# Patient Record
Sex: FEMALE | Race: WHITE | Employment: OTHER | URBAN - METROPOLITAN AREA
[De-identification: names, ages, dates, MRNs, and addresses within clinical notes are randomized per-mention and may not be internally consistent; named-entity substitution may affect disease eponyms.]

---

## 2017-04-14 ENCOUNTER — HOSPITAL ENCOUNTER (OUTPATIENT)
Dept: GENERAL RADIOLOGY | Age: 72
Discharge: HOME | End: 2017-04-14
Payer: MEDICARE

## 2017-04-14 DIAGNOSIS — J41.0 SIMPLE CHRONIC BRONCHITIS (HCC): ICD-10-CM

## 2017-04-14 PROCEDURE — 71020 XR CHEST PA LAT: CPT

## 2017-09-27 ENCOUNTER — HOSPITAL ENCOUNTER (OUTPATIENT)
Dept: MAMMOGRAPHY | Age: 72
Discharge: HOME | End: 2017-09-27
Attending: FAMILY MEDICINE
Payer: MEDICARE

## 2017-09-27 DIAGNOSIS — Z12.31 VISIT FOR SCREENING MAMMOGRAM: ICD-10-CM

## 2017-09-27 PROCEDURE — 77063 BREAST TOMOSYNTHESIS BI: CPT

## 2018-04-11 ENCOUNTER — HOSPITAL ENCOUNTER (OUTPATIENT)
Dept: GENERAL RADIOLOGY | Age: 73
Discharge: HOME | End: 2018-04-11
Attending: FAMILY MEDICINE
Payer: MEDICARE

## 2018-04-11 DIAGNOSIS — E55.9 VITAMIN D DEFICIENCY, UNSPECIFIED: ICD-10-CM

## 2018-04-11 DIAGNOSIS — M81.0 AGE-RELATED OSTEOPOROSIS WITHOUT CURRENT PATHOLOGICAL FRACTURE: ICD-10-CM

## 2018-04-11 PROCEDURE — 77080 DXA BONE DENSITY AXIAL: CPT

## 2018-04-19 ENCOUNTER — HOSPITAL ENCOUNTER (OUTPATIENT)
Dept: GENERAL RADIOLOGY | Age: 73
Discharge: HOME | End: 2018-04-19
Payer: MEDICARE

## 2018-04-19 DIAGNOSIS — M54.50 LOW BACK PAIN: ICD-10-CM

## 2018-04-19 PROCEDURE — 72120 X-RAY BEND ONLY L-S SPINE: CPT

## 2018-04-27 ENCOUNTER — HOSPITAL ENCOUNTER (OUTPATIENT)
Dept: MRI IMAGING | Age: 73
Discharge: HOME | End: 2018-04-27
Attending: FAMILY MEDICINE
Payer: MEDICARE

## 2018-04-27 DIAGNOSIS — M54.50 LUMBAGO: ICD-10-CM

## 2018-04-27 PROCEDURE — 72146 MRI CHEST SPINE W/O DYE: CPT

## 2018-04-27 PROCEDURE — 72148 MRI LUMBAR SPINE W/O DYE: CPT

## 2018-08-21 ENCOUNTER — HOSPITAL ENCOUNTER (OUTPATIENT)
Dept: CT IMAGING | Age: 73
Discharge: HOME | End: 2018-08-21
Attending: OTOLARYNGOLOGY
Payer: MEDICARE

## 2018-08-21 DIAGNOSIS — R22.1 NECK MASS: ICD-10-CM

## 2018-08-21 LAB — CREAT UR-MCNC: 0.8 MG/DL (ref 0.6–1.3)

## 2018-08-21 PROCEDURE — 70491 CT SOFT TISSUE NECK W/DYE: CPT

## 2018-08-21 PROCEDURE — 74011636320 HC RX REV CODE- 636/320: Performed by: OTOLARYNGOLOGY

## 2018-08-21 PROCEDURE — 82565 ASSAY OF CREATININE: CPT

## 2018-08-21 RX ADMIN — IOPAMIDOL 80 ML: 612 INJECTION, SOLUTION INTRAVENOUS at 08:33

## 2018-09-28 ENCOUNTER — HOSPITAL ENCOUNTER (OUTPATIENT)
Dept: MAMMOGRAPHY | Age: 73
Discharge: HOME | End: 2018-09-28
Attending: FAMILY MEDICINE
Payer: MEDICARE

## 2018-09-28 DIAGNOSIS — Z12.31 VISIT FOR SCREENING MAMMOGRAM: ICD-10-CM

## 2018-09-28 PROCEDURE — 77063 BREAST TOMOSYNTHESIS BI: CPT

## 2019-05-08 ENCOUNTER — HOSPITAL ENCOUNTER (OUTPATIENT)
Dept: GENERAL RADIOLOGY | Age: 74
Discharge: HOME | End: 2019-05-08
Payer: MEDICARE

## 2019-05-08 DIAGNOSIS — J44.9 CHRONIC OBSTRUCTIVE PULMONARY DISEASE (COPD) (HCC): ICD-10-CM

## 2019-05-08 PROCEDURE — 71046 X-RAY EXAM CHEST 2 VIEWS: CPT

## 2019-09-04 ENCOUNTER — NURSE NAVIGATOR (OUTPATIENT)
Dept: OTHER | Age: 74
End: 2019-09-04

## 2019-09-04 NOTE — NURSE NAVIGATOR
Referring Provider: Beau Girard MD      Lung Cancer Risk Profile:   Age: 72  Gender: Female  Height: 66\"  Weight: 189#    Smoking History:  Smoking Status: current use  # years smokin  # years quit: 0  Packs/day: 1.5  Pack years: 66    Patient discussed smoking cessation with PCP: Yes, per patient report    Patient participated in shared decision making process with PCP: Unknown    Patient is currently experiencing symptoms: No, per patient report    If yes what symptoms:     Co-Morbidities:  COPD    Cancer History:  Uterine    Additional Risk Factors:    Exposure to second hand smoke      Patient's smoking history discussed via phone. Patient meets LDCT lung cancer screening criteria.  Call transferred to central scheduling to schedule exam.      JEAN PIERRE BullockN, RN, [address] [address] [facility] Nurse Navigator

## 2019-10-04 ENCOUNTER — HOSPITAL ENCOUNTER (OUTPATIENT)
Dept: MAMMOGRAPHY | Age: 74
Discharge: HOME | End: 2019-10-04
Attending: FAMILY MEDICINE
Payer: MEDICARE

## 2019-10-04 DIAGNOSIS — Z12.31 VISIT FOR SCREENING MAMMOGRAM: ICD-10-CM

## 2019-10-04 PROCEDURE — 77063 BREAST TOMOSYNTHESIS BI: CPT

## 2019-11-19 ENCOUNTER — HOSPITAL ENCOUNTER (OUTPATIENT)
Dept: CT IMAGING | Age: 74
Discharge: HOME | End: 2019-11-19
Attending: FAMILY MEDICINE
Payer: MEDICARE

## 2019-11-19 VITALS — HEIGHT: 66 IN | WEIGHT: 246.91 LBS | BODY MASS INDEX: 39.68 KG/M2

## 2019-11-19 DIAGNOSIS — Z72.0 TOBACCO ABUSE: ICD-10-CM

## 2019-11-19 PROCEDURE — G0297 LDCT FOR LUNG CA SCREEN: HCPCS

## 2020-04-24 ENCOUNTER — APPOINTMENT (OUTPATIENT)
Dept: GENERAL RADIOLOGY | Age: 75
DRG: 482 | End: 2020-04-24
Attending: EMERGENCY MEDICINE
Payer: MEDICARE

## 2020-04-24 ENCOUNTER — HOSPITAL ENCOUNTER (INPATIENT)
Age: 75
LOS: 4 days | Discharge: NURSING, REHAB OR LONG-TERM CARE | DRG: 482 | End: 2020-04-28
Attending: EMERGENCY MEDICINE | Admitting: HOSPITALIST
Payer: MEDICARE

## 2020-04-24 ENCOUNTER — ANESTHESIA EVENT (OUTPATIENT)
Dept: SURGERY | Age: 75
DRG: 482 | End: 2020-04-24
Payer: MEDICARE

## 2020-04-24 DIAGNOSIS — S82.832A CLOSED FRACTURE OF DISTAL END OF LEFT FIBULA, UNSPECIFIED FRACTURE MORPHOLOGY, INITIAL ENCOUNTER: ICD-10-CM

## 2020-04-24 DIAGNOSIS — S72.145A CLOSED NONDISPLACED INTERTROCHANTERIC FRACTURE OF LEFT FEMUR, INITIAL ENCOUNTER (HCC): Primary | ICD-10-CM

## 2020-04-24 PROBLEM — J44.9 COPD (CHRONIC OBSTRUCTIVE PULMONARY DISEASE) (HCC): Status: ACTIVE | Noted: 2020-04-24

## 2020-04-24 PROBLEM — S72.009A HIP FRACTURE (HCC): Status: ACTIVE | Noted: 2020-04-24

## 2020-04-24 LAB
ABO + RH BLD: NORMAL
ALBUMIN SERPL-MCNC: 2.9 G/DL (ref 3.4–5)
ALBUMIN/GLOB SERPL: 0.8 {RATIO} (ref 0.8–1.7)
ALP SERPL-CCNC: 84 U/L (ref 45–117)
ALT SERPL-CCNC: 22 U/L (ref 13–56)
ANION GAP SERPL CALC-SCNC: 5 MMOL/L (ref 3–18)
APPEARANCE UR: CLEAR
AST SERPL-CCNC: 20 U/L (ref 10–38)
BACTERIA URNS QL MICRO: ABNORMAL /HPF
BASOPHILS # BLD: 0 K/UL (ref 0–0.1)
BASOPHILS NFR BLD: 0 % (ref 0–3)
BILIRUB SERPL-MCNC: 0.4 MG/DL (ref 0.2–1)
BILIRUB UR QL: NEGATIVE
BLOOD GROUP ANTIBODIES SERPL: NORMAL
BUN SERPL-MCNC: 15 MG/DL (ref 7–18)
BUN/CREAT SERPL: 19 (ref 12–20)
CALCIUM SERPL-MCNC: 9.1 MG/DL (ref 8.5–10.1)
CHLORIDE SERPL-SCNC: 107 MMOL/L (ref 100–111)
CO2 SERPL-SCNC: 25 MMOL/L (ref 21–32)
COLOR UR: ABNORMAL
CREAT SERPL-MCNC: 0.78 MG/DL (ref 0.6–1.3)
DIFFERENTIAL METHOD BLD: ABNORMAL
EOSINOPHIL # BLD: 0 K/UL (ref 0–0.4)
EOSINOPHIL NFR BLD: 0 % (ref 0–5)
EPITH CASTS URNS QL MICRO: ABNORMAL /LPF (ref 0–5)
ERYTHROCYTE [DISTWIDTH] IN BLOOD BY AUTOMATED COUNT: 14.3 % (ref 11.6–14.5)
GLOBULIN SER CALC-MCNC: 3.7 G/DL (ref 2–4)
GLUCOSE SERPL-MCNC: 104 MG/DL (ref 74–99)
GLUCOSE UR STRIP.AUTO-MCNC: NEGATIVE MG/DL
HCT VFR BLD AUTO: 32.6 % (ref 35–45)
HGB BLD-MCNC: 10.7 G/DL (ref 12–16)
HGB UR QL STRIP: NEGATIVE
HYALINE CASTS URNS QL MICRO: ABNORMAL /LPF (ref 0–2)
INR PPP: 1.2 (ref 0.8–1.2)
KETONES UR QL STRIP.AUTO: NEGATIVE MG/DL
LEUKOCYTE ESTERASE UR QL STRIP.AUTO: ABNORMAL
LYMPHOCYTES # BLD: 1.4 K/UL (ref 0.8–3.5)
LYMPHOCYTES NFR BLD: 12 % (ref 20–51)
MCH RBC QN AUTO: 33.3 PG (ref 24–34)
MCHC RBC AUTO-ENTMCNC: 32.8 G/DL (ref 31–37)
MCV RBC AUTO: 101.6 FL (ref 74–97)
MONOCYTES # BLD: 0.4 K/UL (ref 0–1)
MONOCYTES NFR BLD: 3 % (ref 2–9)
MUCOUS THREADS URNS QL MICRO: ABNORMAL /LPF
NEUTS SEG # BLD: 9.9 K/UL (ref 1.8–8)
NEUTS SEG NFR BLD: 85 % (ref 42–75)
NITRITE UR QL STRIP.AUTO: NEGATIVE
PH UR STRIP: 6 [PH] (ref 5–8)
PLATELET # BLD AUTO: 336 K/UL (ref 135–420)
PMV BLD AUTO: 10.2 FL (ref 9.2–11.8)
POTASSIUM SERPL-SCNC: 4.4 MMOL/L (ref 3.5–5.5)
PROT SERPL-MCNC: 6.6 G/DL (ref 6.4–8.2)
PROT UR STRIP-MCNC: NEGATIVE MG/DL
PROTHROMBIN TIME: 14.9 SEC (ref 11.5–15.2)
RBC # BLD AUTO: 3.21 M/UL (ref 4.2–5.3)
RBC #/AREA URNS HPF: ABNORMAL /HPF (ref 0–5)
RBC MORPH BLD: ABNORMAL
SODIUM SERPL-SCNC: 137 MMOL/L (ref 136–145)
SP GR UR REFRACTOMETRY: 1.01 (ref 1–1.03)
SPECIMEN EXP DATE BLD: NORMAL
UROBILINOGEN UR QL STRIP.AUTO: 0.2 EU/DL (ref 0.2–1)
WBC # BLD AUTO: 11.7 K/UL (ref 4.6–13.2)
WBC URNS QL MICRO: ABNORMAL /HPF (ref 0–4)

## 2020-04-24 PROCEDURE — 96374 THER/PROPH/DIAG INJ IV PUSH: CPT

## 2020-04-24 PROCEDURE — 85025 COMPLETE CBC W/AUTO DIFF WBC: CPT

## 2020-04-24 PROCEDURE — 74011000250 HC RX REV CODE- 250: Performed by: HOSPITALIST

## 2020-04-24 PROCEDURE — 73552 X-RAY EXAM OF FEMUR 2/>: CPT

## 2020-04-24 PROCEDURE — 77030040830 HC CATH URETH FOL MDII -A

## 2020-04-24 PROCEDURE — 96375 TX/PRO/DX INJ NEW DRUG ADDON: CPT

## 2020-04-24 PROCEDURE — 74011250636 HC RX REV CODE- 250/636: Performed by: HOSPITALIST

## 2020-04-24 PROCEDURE — 96376 TX/PRO/DX INJ SAME DRUG ADON: CPT

## 2020-04-24 PROCEDURE — 81001 URINALYSIS AUTO W/SCOPE: CPT

## 2020-04-24 PROCEDURE — 77030040361 HC SLV COMPR DVT MDII -B

## 2020-04-24 PROCEDURE — 80053 COMPREHEN METABOLIC PANEL: CPT

## 2020-04-24 PROCEDURE — 72170 X-RAY EXAM OF PELVIS: CPT

## 2020-04-24 PROCEDURE — 86901 BLOOD TYPING SEROLOGIC RH(D): CPT

## 2020-04-24 PROCEDURE — 75810000053 HC SPLINT APPLICATION

## 2020-04-24 PROCEDURE — 85610 PROTHROMBIN TIME: CPT

## 2020-04-24 PROCEDURE — 94640 AIRWAY INHALATION TREATMENT: CPT

## 2020-04-24 PROCEDURE — 99285 EMERGENCY DEPT VISIT HI MDM: CPT

## 2020-04-24 PROCEDURE — 73610 X-RAY EXAM OF ANKLE: CPT

## 2020-04-24 PROCEDURE — 74011250636 HC RX REV CODE- 250/636: Performed by: EMERGENCY MEDICINE

## 2020-04-24 PROCEDURE — 65270000029 HC RM PRIVATE

## 2020-04-24 RX ORDER — MORPHINE SULFATE 2 MG/ML
2 INJECTION, SOLUTION INTRAMUSCULAR; INTRAVENOUS
Status: DISCONTINUED | OUTPATIENT
Start: 2020-04-24 | End: 2020-04-28 | Stop reason: HOSPADM

## 2020-04-24 RX ORDER — MORPHINE SULFATE 4 MG/ML
4 INJECTION, SOLUTION INTRAMUSCULAR; INTRAVENOUS
Status: COMPLETED | OUTPATIENT
Start: 2020-04-24 | End: 2020-04-24

## 2020-04-24 RX ORDER — MORPHINE SULFATE 2 MG/ML
4 INJECTION, SOLUTION INTRAMUSCULAR; INTRAVENOUS ONCE
Status: COMPLETED | OUTPATIENT
Start: 2020-04-24 | End: 2020-04-24

## 2020-04-24 RX ORDER — ONDANSETRON 2 MG/ML
4 INJECTION INTRAMUSCULAR; INTRAVENOUS
Status: COMPLETED | OUTPATIENT
Start: 2020-04-24 | End: 2020-04-24

## 2020-04-24 RX ORDER — ARFORMOTEROL TARTRATE 15 UG/2ML
15 SOLUTION RESPIRATORY (INHALATION)
Status: DISCONTINUED | OUTPATIENT
Start: 2020-04-24 | End: 2020-04-28 | Stop reason: HOSPADM

## 2020-04-24 RX ORDER — NALOXONE HYDROCHLORIDE 0.4 MG/ML
0.4 INJECTION, SOLUTION INTRAMUSCULAR; INTRAVENOUS; SUBCUTANEOUS AS NEEDED
Status: DISCONTINUED | OUTPATIENT
Start: 2020-04-24 | End: 2020-04-25 | Stop reason: SDUPTHER

## 2020-04-24 RX ORDER — ALLOPURINOL 100 MG/1
300 TABLET ORAL DAILY
Status: DISCONTINUED | OUTPATIENT
Start: 2020-04-25 | End: 2020-04-28 | Stop reason: HOSPADM

## 2020-04-24 RX ORDER — DEXTROSE, SODIUM CHLORIDE, AND POTASSIUM CHLORIDE 5; .45; .15 G/100ML; G/100ML; G/100ML
100 INJECTION INTRAVENOUS CONTINUOUS
Status: DISCONTINUED | OUTPATIENT
Start: 2020-04-24 | End: 2020-04-28 | Stop reason: HOSPADM

## 2020-04-24 RX ORDER — BUDESONIDE 0.5 MG/2ML
500 INHALANT ORAL
Status: DISCONTINUED | OUTPATIENT
Start: 2020-04-24 | End: 2020-04-28 | Stop reason: HOSPADM

## 2020-04-24 RX ADMIN — SODIUM CHLORIDE 1000 ML: 900 INJECTION, SOLUTION INTRAVENOUS at 14:18

## 2020-04-24 RX ADMIN — MORPHINE SULFATE 2 MG: 2 INJECTION, SOLUTION INTRAMUSCULAR; INTRAVENOUS at 20:58

## 2020-04-24 RX ADMIN — ONDANSETRON 4 MG: 2 INJECTION INTRAMUSCULAR; INTRAVENOUS at 11:00

## 2020-04-24 RX ADMIN — BUDESONIDE 500 MCG: 0.5 INHALANT RESPIRATORY (INHALATION) at 20:22

## 2020-04-24 RX ADMIN — DEXTROSE MONOHYDRATE, SODIUM CHLORIDE, AND POTASSIUM CHLORIDE 100 ML/HR: 50; 4.5; 1.49 INJECTION, SOLUTION INTRAVENOUS at 16:57

## 2020-04-24 RX ADMIN — ARFORMOTEROL TARTRATE 15 MCG: 15 SOLUTION RESPIRATORY (INHALATION) at 20:22

## 2020-04-24 RX ADMIN — MORPHINE SULFATE 4 MG: 4 INJECTION, SOLUTION INTRAMUSCULAR; INTRAVENOUS at 14:19

## 2020-04-24 RX ADMIN — MORPHINE SULFATE 2 MG: 2 INJECTION, SOLUTION INTRAMUSCULAR; INTRAVENOUS at 16:57

## 2020-04-24 RX ADMIN — SODIUM CHLORIDE 500 ML: 900 INJECTION, SOLUTION INTRAVENOUS at 10:59

## 2020-04-24 RX ADMIN — MORPHINE SULFATE 4 MG: 2 INJECTION, SOLUTION INTRAMUSCULAR; INTRAVENOUS at 11:01

## 2020-04-24 NOTE — ED PROVIDER NOTES
[address] [facility]  EMERGENCY DEPARTMENT HISTORY AND PHYSICAL EXAM       Date: 4/24/2020   Patient Name: Aquiles Bowden   YOB: 1945  Medical Record Number: [id number]    HISTORY OF PRESENTING ILLNESS:     Aquiles Bowden is a 69 y.o. female presenting with the noted PMH to the ED c/o fall. Patient states she was going up 2 stairs to get to her house and drop off her groceries. When the black mat that she has on her steps twisted with her as she turned around had back down to 2 stairs and ended up falling down this 2 steps. She has pain in her left hip and left ankle area. She is is constant. Increases when you try to touch or move it. No decreasing factors. No pain meds at home. However did get morphine in route. Feeling better after the morphine but still having pain now. Denies any headache or neck or back pain. Denies any chest pain. Denies abdominal pain. Denies any right leg or arm pain. Denies any cough or cold symptoms. Denies any shortness of breath. Rest of 10 systems reviewed and negative. Primary Care Provider: Ria Egan MD   Specialist:    Past Medical History:   Past Medical History:   Diagnosis Date    Anxiety     Arthritis     Cancer [facility]) 1979    uterine    [facility] ([facility] [address]     Chronic obstructive pulmonary disease ([facility] [address] [facility]     Depression     Gout     Hyperlipemia     Menopause     Vitamin D deficiency         Past Surgical History:   Past Surgical History:   Procedure Laterality Date    HX HEENT      mass removed from neck    HX HYSTERECTOMY          Social History:   Social History     Tobacco Use    Smoking status: Current Every Day Smoker     Packs/day: 1.50   Substance Use Topics    Alcohol use:  Yes     Alcohol/week: 5.0 standard drinks     Types: 6 Cans of beer per week     Comment: occas    Drug use: No        Allergies:   No Known Allergies     REVIEW OF SYSTEMS:  Review of Systems      PHYSICAL EXAM:  Vitals: 04/24/20 1037 04/24/20 1342 04/24/20 1343   BP: 135/55 123/73    Pulse: 73  82   Resp: 18  18   Temp: 97.7 °F (36.5 °C)     SpO2: 99% 98% 99%   Weight: 86.2 kg (190 lb)     Height: 5' 6.5\" (1.689 m)         Physical Exam   Vital signs reviewed. Alert oriented x 3 in NAD. HEENT: normocephalic atraumatic. Eyes are PERRLA EOMI. Conjunctiva normal.    External ears and nose normal.    Neck: normal external exam. No midline neck or back TTP. Lungs are clear to ascultation bilaterally. normal effort  Heart is regular rate and rhythm with no murmurs. Abdomen soft and nontender. No rebound rigidity or guarding. Extremities: Moves all 4 extremities and no distress. Full range of motion. 2+ pulses and BCR in all 4 extremities. TTP of left lateral hip and left lateral ankle. With STS. No step-offs or crepitus. Flexion extension intact distally. No edema. No calf tenderness. Neuro:   5 out of 5 strength in all 4 extremities. No facial droop. No pronator drift. Normal finger-to-nose. Normal speech. Skin examination: intact. no rashes. No petechia or purpura.       Medications   naloxone (NARCAN) injection 0.4 mg (has no administration in time range)   sodium chloride 0.9 % bolus infusion 1,000 mL (has no administration in time range)   morphine injection 4 mg (has no administration in time range)   morphine injection 4 mg (4 mg IntraVENous Given 4/24/20 1101)   ondansetron (ZOFRAN) injection 4 mg (4 mg IntraVENous Given 4/24/20 1100)   sodium chloride 0.9 % bolus infusion 500 mL (500 mL IntraVENous New Bag 4/24/20 1059)       RESULTS:    Labs -   Labs Reviewed   CBC WITH AUTOMATED DIFF   PROTHROMBIN TIME + INR   METABOLIC PANEL, COMPREHENSIVE   TYPE & SCREEN       Radiologic Studies -  Xr Femur Lt 2 V    Result Date: 4/24/2020  EXAM: XR PELV 1 OR 2 V, XR FEMUR LT 2 V CLINICAL INDICATION/HISTORY: fall pain -Additional: None COMPARISON: None TECHNIQUE: Single view of the pelvis as well as 2 views of the left femur _______________ FINDINGS: BONES: Images demonstrate a obliquely oriented nondisplaced intertrochanteric fracture. Generalized bony demineralization. Mild enthesopathy. No additional fractures. Alignment of the hip and knee are anatomic. The right hip is normally aligned. SOFT TISSUES: Within normal limits. No radiopaque foreign body. _______________     IMPRESSION: 1. Nondisplaced intratrochanteric fracture on the left. Xr Ankle Lt Min 3 V    Result Date: 4/24/2020  EXAM: LEFT ANKLE RADIOGRAPHS CLINICAL INDICATION/HISTORY: fall, pain -Additional: None COMPARISON: None TECHNIQUE: 3 views of the left ankle _______________ FINDINGS: BONES: Fracture of the distal fibular tip. No other fracture identified. No area of erosion or aggressive-appearing bone destruction. SOFT TISSUES: Associated soft tissue swelling about the ankle. _______________     IMPRESSION: Fracture of the distal fibular tip. Xr Pelv 1 Or 2 V    Result Date: 4/24/2020  EXAM: XR PELV 1 OR 2 V, XR FEMUR LT 2 V CLINICAL INDICATION/HISTORY: fall pain -Additional: None COMPARISON: None TECHNIQUE: Single view of the pelvis as well as 2 views of the left femur _______________ FINDINGS: BONES: Images demonstrate a obliquely oriented nondisplaced intertrochanteric fracture. Generalized bony demineralization. Mild enthesopathy. No additional fractures. Alignment of the hip and knee are anatomic. The right hip is normally aligned. SOFT TISSUES: Within normal limits. No radiopaque foreign body. _______________     IMPRESSION: 1. Nondisplaced intratrochanteric fracture on the left. MEDICAL DECISION MAKING    Keep the patient n.p.o.  IV fluids started. Positive nondisplaced fracture of her left femur. Good pulses distally. Trying to limit movement of the leg with having the hip fracture at this point will place air splint. Will admit patient for further evaluation and care. Blood work ordered. Patient agrees with plan.   No evidence of compartment syndrome. No evidence of septic joint. No evidence of dislocation. Pain controlled with morphine. 1230. Patient reassessed. States feeling better. Went for x-ray. Awaiting results. 1345. Patient reassessed. Updated results. Agrees with plan for admission. 1355. [facility] hospitalist.  [facility] orthopedics. 1414. Patient accepted by Dr. Leonides Quinones for admission and further evaluation and care. Diagnosis   Clinical Impression:   1. Closed nondisplaced intertrochanteric fracture of left femur, initial encounter ([facility] [address]    2. Closed fracture of distal end of left fibula, unspecified fracture morphology, initial encounter             Admitted in stable and improved condition. This chart was completed using Dragon, a dictation transcription service. Errors may have resulted from using this device.

## 2020-04-24 NOTE — CONSULTS
ORTHO    Imaging reviewed remotely. Plan for left femur cephalomedullary nail tomorrow morning. NPO after midnight tonight and hold blood thinners.     Full consult note to follow    - Render CARLINE Gauthier  4/24/2020    [facility]   Office [phone]

## 2020-04-24 NOTE — ED TRIAGE NOTES
Patient brought by EMS for fall down two stairs. Patient was attempting to put groceries on porch and lost balance. Co left ankle and left hip pain. States she felt a pop in hip when she fell. Patient received 5mg morphine, 4mg zofran, and roughly 300ml normal saline by EMS.

## 2020-04-24 NOTE — PROGRESS NOTES
completed the initial Spiritual Assessment of the patient in bed 4 of the emergency room , and offered Pastoral Care support ,. Patient does not have any Congregation/cultural needs that will affect patients preferences in health care. Chaplains will continue to follow and will provide pastoral care on an as needed/requested basis.     [facility]  [phone]

## 2020-04-24 NOTE — H&P
History and Physical    Patient: Nadira Stephens               Sex: female          DOA: 4/24/2020       YOB: 1945      Age:  69 y.o.        LOS:  LOS: 0 days        HPI:     Nadira Stephens is a 69 y.o. female who fell at home as she was walking on her porch. Her feet got caught on aleah that was on the ground. She fell and landed on her left hip and ankle. She did not have chest pain. She did not have SOB. She reports that she \"just tripped. \"   She has known COPD but it is not in exacerbation. In the ER she is found to have left hip fracture. Past Medical History:   Diagnosis Date    Anxiety     Arthritis     Cancer ([facility] [address] 1979    uterine    Chronic bronchitis (HCC)     Chronic obstructive pulmonary disease ([facility] [address]     Depression     Gout     Hyperlipemia     Menopause     Vitamin D deficiency        Social History:   Tobacco use:  Patient has smoked since the age of 27   Alcohol use:  Patient uses alcohol occasionally   Patient lives with 2 room mates, they don't have much contact. Family History:   Both parents had CAD    Review of Systems    Constitutional:  No fever or weight loss  HEENT:  No headache or visual changes  Cardiovascular:  No chest pain or diaphoresis  Respiratory:  No coughing, wheezing, or shortness of breath. GI:  No nausea or vomitting. No diarrhea  :  No hematuria or dysuria  Skin:  No rashes or moles  Neuro:  No seizures or syncope  Hematological:  No bruising or bleeding  Endocrine:  No diabetes or thyroid disease    Physical Exam:      Visit Vitals  /51 (BP 1 Location: Right arm, BP Patient Position: At rest)   Pulse 90   Temp 97.8 °F (36.6 °C)   Resp 18   Ht 5' 6.5\" (1.689 m)   Wt 86.2 kg (190 lb)   SpO2 100%   BMI 30.21 kg/m²       Physical Exam:    Gen:  No distress, alert  HEENT:  Normal cephalic atraumatic, extra-occular movements are intact.   Neck:  Supple, No JVD  Lungs:  Clear bilaterally, no wheeze, no rales, normal effort  Heart: Regular Rate and Rhythm, normal S1 and S2, no edema  Abdomen:  Soft, non tender, normal bowel sounds, no guarding. Extremities:  Well perfused, no cyanosis or edema  Neurological:  Awake and alert, CN's are intact, normal strength throughout extremities  Skin:  No rashes or moles  Mental Status:  Normal thought process, does not appear anxious    Laboratory Studies:    BMP:   Lab Results   Component Value Date/Time     04/24/2020 02:10 PM    K 4.4 04/24/2020 02:10 PM     04/24/2020 02:10 PM    CO2 25 04/24/2020 02:10 PM    AGAP 5 04/24/2020 02:10 PM     (H) 04/24/2020 02:10 PM    BUN 15 04/24/2020 02:10 PM    CREA 0.78 04/24/2020 02:10 PM    GFRAA >60 04/24/2020 02:10 PM    GFRNA >60 04/24/2020 02:10 PM     CBC:   Lab Results   Component Value Date/Time    WBC 11.7 04/24/2020 02:10 PM    HGB 10.7 (L) 04/24/2020 02:10 PM    HCT 32.6 (L) 04/24/2020 02:10 PM     04/24/2020 02:10 PM       Assessment/Plan     Principal Problem:    Hip fracture ([facility] [address] (4/24/2020)    Active Problems:    COPD (chronic obstructive pulmonary disease) ([facility] [address] (4/24/2020)        PLAN:    Orthopedics consulted  No further workup is needed for clearance. Patient is clear to proceed with anesthesia with standard risk for age. Follow respiratory status, she is not in exacerbation  DVT prophylaxis.

## 2020-04-24 NOTE — PROGRESS NOTES
Reason for Admission:     1. Closed nondisplaced intertrochanteric fracture of left femur, initial encounter ([facility] [address]    2. Closed fracture of distal end of left fibula, unspecified fracture morphology, initial encounter                    RUR Score:      5               Plan for utilizing home health:      TBD    PCP: First and Last name:  Maryuri Randhawa   Name of Practice:    Are you a current patient: Yes/No: Yes   Approximate date of last visit: December 2018                    Current Advanced Directive/Advance Care Plan:  N/A                         Transition of Care Plan:        SNF              Initial assessment completed with pt at the bedside in ED bed 4. Prior to admission pt lived alone in a 2 story home with 3 steps to enter, bedroom and full bath are on the 1st level of the residence. Pt was independent with ambulation and ADL's. Pt denies any prior home care services or DME. Discussed with pt the role of care management and the services provided. Pt stated \"there's no way that I can go back home after this. \" Pt states that she is not familiar with any SNFs and requested \"a clean one. \" I explained to pt that care management can not recommend a SNF, and encouraged her to reach out to family members or friends who may be more familiar. Pt is agreeable to being posted out to [facility]. I also educated the pt about home health and private duty. Care Management will continue to follow pt pending her progress with PT & OT.         Steph Medina, MSN, RN, ACM-RN   ED Outcomes Manager  [phone] (phone)

## 2020-04-24 NOTE — ED NOTES
TRANSFER - ED to INPATIENT REPORT:    Verbal report given to [facility] (name) on Nadira Stephens  being transferred to [phone] (unit) for routine progression of care       Report consisted of patients Situation, Background, Assessment and   Recommendations(SBAR). SBAR report made available to receiving floor on this patient being transferred to [facility]  for routine progression of care       Admitting diagnosis Hip fracture ([address] Mariya Lapping    Information from the following report(s) SBAR was made available to receiving floor. Lines:   Peripheral IV 04/24/20 Left Wrist (Active)   Site Assessment Clean, dry, & intact 4/24/2020 10:41 AM   Phlebitis Assessment 0 4/24/2020 10:41 AM   Infiltration Assessment 0 4/24/2020 10:41 AM   Dressing Status Clean, dry, & intact 4/24/2020 10:41 AM   Dressing Type Transparent 4/24/2020 10:41 AM        HCG Status for ALL Females under 53 y/o: NO       Opportunity for questions and clarification was provided. Patient is oriented to time, place, person and situation  Patient is  continent and non-ambulatory     Valuables transported with patient     Patient transported with:   Tech    MAP (Monitor): 84 =Monitored (most recent)  Vitals w/ MEWS Score (last day)     Date/Time MEWS Score Pulse Resp Temp BP Level of Consciousness SpO2    04/24/20 15:36:47  1  82  18  97.7 °F (36.5 °C)  123/73  Alert  99 %    04/24/20 1343    82  18        99 %    04/24/20 1342          123/73    98 %    04/24/20 1037  1  73  18  97.7 °F (36.5 °C)  135/55  Alert  99 %              Septic Patients:     Lactic Acid  No results found for: LACPOC (Most recent on top)  Repeat drawn: NO Time: NA     ALL LACTIC ACIDS GREATER THAN 2 MUST BE REPEATED POC WITHIN 4 HOURS OR PRIOR TO ADMISSION               Total Fluid Bolus initiated and documented on MAR: NO    All ordered antibiotics initiated within first 3 hours of TIME ZERO?   NO

## 2020-04-25 ENCOUNTER — HOSPITAL ENCOUNTER (INPATIENT)
Dept: GENERAL RADIOLOGY | Age: 75
Discharge: HOME | DRG: 482 | End: 2020-04-25
Attending: ORTHOPAEDIC SURGERY
Payer: MEDICARE

## 2020-04-25 ENCOUNTER — ANESTHESIA (OUTPATIENT)
Dept: SURGERY | Age: 75
DRG: 482 | End: 2020-04-25
Payer: MEDICARE

## 2020-04-25 LAB
BASOPHILS # BLD: 0 K/UL (ref 0–0.1)
BASOPHILS NFR BLD: 0 % (ref 0–2)
DIFFERENTIAL METHOD BLD: ABNORMAL
EOSINOPHIL # BLD: 0.1 K/UL (ref 0–0.4)
EOSINOPHIL NFR BLD: 1 % (ref 0–5)
ERYTHROCYTE [DISTWIDTH] IN BLOOD BY AUTOMATED COUNT: 14.3 % (ref 11.6–14.5)
HCT VFR BLD AUTO: 31.1 % (ref 35–45)
HGB BLD-MCNC: 10 G/DL (ref 12–16)
LYMPHOCYTES # BLD: 1.5 K/UL (ref 0.9–3.6)
LYMPHOCYTES NFR BLD: 17 % (ref 21–52)
MCH RBC QN AUTO: 33.3 PG (ref 24–34)
MCHC RBC AUTO-ENTMCNC: 32.2 G/DL (ref 31–37)
MCV RBC AUTO: 103.7 FL (ref 74–97)
MONOCYTES # BLD: 0.6 K/UL (ref 0.05–1.2)
MONOCYTES NFR BLD: 7 % (ref 3–10)
NEUTS SEG # BLD: 6.9 K/UL (ref 1.8–8)
NEUTS SEG NFR BLD: 75 % (ref 40–73)
PLATELET # BLD AUTO: 348 K/UL (ref 135–420)
PMV BLD AUTO: 10.3 FL (ref 9.2–11.8)
RBC # BLD AUTO: 3 M/UL (ref 4.2–5.3)
WBC # BLD AUTO: 9.1 K/UL (ref 4.6–13.2)

## 2020-04-25 PROCEDURE — 74011250636 HC RX REV CODE- 250/636: Performed by: ORTHOPAEDIC SURGERY

## 2020-04-25 PROCEDURE — 94761 N-INVAS EAR/PLS OXIMETRY MLT: CPT

## 2020-04-25 PROCEDURE — 74011000250 HC RX REV CODE- 250: Performed by: ORTHOPAEDIC SURGERY

## 2020-04-25 PROCEDURE — 77030018846 HC SOL IRR STRL H20 ICUM -A: Performed by: ORTHOPAEDIC SURGERY

## 2020-04-25 PROCEDURE — 77030018673: Performed by: ORTHOPAEDIC SURGERY

## 2020-04-25 PROCEDURE — 77030031139 HC SUT VCRL2 J&J -A: Performed by: ORTHOPAEDIC SURGERY

## 2020-04-25 PROCEDURE — 74011000272 HC RX REV CODE- 272: Performed by: ORTHOPAEDIC SURGERY

## 2020-04-25 PROCEDURE — 74011250636 HC RX REV CODE- 250/636: Performed by: HOSPITALIST

## 2020-04-25 PROCEDURE — 74011250636 HC RX REV CODE- 250/636: Performed by: PHYSICIAN ASSISTANT

## 2020-04-25 PROCEDURE — 74011250636 HC RX REV CODE- 250/636: Performed by: NURSE ANESTHETIST, CERTIFIED REGISTERED

## 2020-04-25 PROCEDURE — 77030018836 HC SOL IRR NACL ICUM -A: Performed by: ORTHOPAEDIC SURGERY

## 2020-04-25 PROCEDURE — 77030032490 HC SLV COMPR SCD KNE COVD -B: Performed by: ORTHOPAEDIC SURGERY

## 2020-04-25 PROCEDURE — 74011000250 HC RX REV CODE- 250: Performed by: HOSPITALIST

## 2020-04-25 PROCEDURE — C1713 ANCHOR/SCREW BN/BN,TIS/BN: HCPCS | Performed by: ORTHOPAEDIC SURGERY

## 2020-04-25 PROCEDURE — 94640 AIRWAY INHALATION TREATMENT: CPT

## 2020-04-25 PROCEDURE — 77030025666 HC GD PIN THRD ORTHO BIOM -C: Performed by: ORTHOPAEDIC SURGERY

## 2020-04-25 PROCEDURE — 76060000033 HC ANESTHESIA 1 TO 1.5 HR: Performed by: ORTHOPAEDIC SURGERY

## 2020-04-25 PROCEDURE — 85025 COMPLETE CBC W/AUTO DIFF WBC: CPT

## 2020-04-25 PROCEDURE — 76210000017 HC OR PH I REC 1.5 TO 2 HR: Performed by: ORTHOPAEDIC SURGERY

## 2020-04-25 PROCEDURE — 74011250637 HC RX REV CODE- 250/637: Performed by: ORTHOPAEDIC SURGERY

## 2020-04-25 PROCEDURE — 77030021678 HC GLIDESCP STAT DISP VERT -B: Performed by: ANESTHESIOLOGY

## 2020-04-25 PROCEDURE — 77030008477 HC STYL SATN SLP COVD -A: Performed by: ANESTHESIOLOGY

## 2020-04-25 PROCEDURE — 73502 X-RAY EXAM HIP UNI 2-3 VIEWS: CPT

## 2020-04-25 PROCEDURE — 77030020269 HC MISC IMPL: Performed by: ORTHOPAEDIC SURGERY

## 2020-04-25 PROCEDURE — 77030020268 HC MISC GENERAL SUPPLY: Performed by: ORTHOPAEDIC SURGERY

## 2020-04-25 PROCEDURE — 93005 ELECTROCARDIOGRAM TRACING: CPT

## 2020-04-25 PROCEDURE — 77030008683 HC TU ET CUF COVD -A: Performed by: ANESTHESIOLOGY

## 2020-04-25 PROCEDURE — 65270000029 HC RM PRIVATE

## 2020-04-25 PROCEDURE — 74011250637 HC RX REV CODE- 250/637: Performed by: HOSPITALIST

## 2020-04-25 PROCEDURE — 36415 COLL VENOUS BLD VENIPUNCTURE: CPT

## 2020-04-25 PROCEDURE — 74011000250 HC RX REV CODE- 250: Performed by: NURSE ANESTHETIST, CERTIFIED REGISTERED

## 2020-04-25 PROCEDURE — 76010000149 HC OR TIME 1 TO 1.5 HR: Performed by: ORTHOPAEDIC SURGERY

## 2020-04-25 PROCEDURE — 74011000258 HC RX REV CODE- 258: Performed by: NURSE ANESTHETIST, CERTIFIED REGISTERED

## 2020-04-25 DEVICE — SCREW BNE L90MM DIA10.5MM ST LAG SQ DRVR SOCK FOR AFFIXUS: Type: IMPLANTABLE DEVICE | Site: HIP | Status: FUNCTIONAL

## 2020-04-25 DEVICE — SCREW BNE L105MM DIA10.5MM LAG FOR AFFIXUS HIP FRAC NAIL: Type: IMPLANTABLE DEVICE | Site: HIP | Status: FUNCTIONAL

## 2020-04-25 DEVICE — IMPLANTABLE DEVICE: Type: IMPLANTABLE DEVICE | Site: HIP | Status: FUNCTIONAL

## 2020-04-25 RX ORDER — SUCCINYLCHOLINE CHLORIDE 100 MG/5ML
SYRINGE (ML) INTRAVENOUS AS NEEDED
Status: DISCONTINUED | OUTPATIENT
Start: 2020-04-25 | End: 2020-04-25 | Stop reason: HOSPADM

## 2020-04-25 RX ORDER — HYDROMORPHONE HYDROCHLORIDE 1 MG/ML
0.5 INJECTION, SOLUTION INTRAMUSCULAR; INTRAVENOUS; SUBCUTANEOUS
Status: DISCONTINUED | OUTPATIENT
Start: 2020-04-25 | End: 2020-04-25 | Stop reason: HOSPADM

## 2020-04-25 RX ORDER — POLYETHYLENE GLYCOL 3350 17 G/17G
17 POWDER, FOR SOLUTION ORAL DAILY
Status: DISCONTINUED | OUTPATIENT
Start: 2020-04-25 | End: 2020-04-28 | Stop reason: HOSPADM

## 2020-04-25 RX ORDER — HYDROMORPHONE HYDROCHLORIDE 1 MG/ML
INJECTION, SOLUTION INTRAMUSCULAR; INTRAVENOUS; SUBCUTANEOUS AS NEEDED
Status: DISCONTINUED | OUTPATIENT
Start: 2020-04-25 | End: 2020-04-25 | Stop reason: HOSPADM

## 2020-04-25 RX ORDER — FENTANYL CITRATE 50 UG/ML
INJECTION, SOLUTION INTRAMUSCULAR; INTRAVENOUS AS NEEDED
Status: DISCONTINUED | OUTPATIENT
Start: 2020-04-25 | End: 2020-04-25 | Stop reason: HOSPADM

## 2020-04-25 RX ORDER — ENOXAPARIN SODIUM 100 MG/ML
40 INJECTION SUBCUTANEOUS DAILY
Status: DISCONTINUED | OUTPATIENT
Start: 2020-04-25 | End: 2020-04-28 | Stop reason: HOSPADM

## 2020-04-25 RX ORDER — SODIUM CHLORIDE 0.9 % (FLUSH) 0.9 %
5-40 SYRINGE (ML) INJECTION EVERY 8 HOURS
Status: DISCONTINUED | OUTPATIENT
Start: 2020-04-25 | End: 2020-04-25 | Stop reason: HOSPADM

## 2020-04-25 RX ORDER — HYDROMORPHONE HYDROCHLORIDE 1 MG/ML
1 INJECTION, SOLUTION INTRAMUSCULAR; INTRAVENOUS; SUBCUTANEOUS
Status: DISCONTINUED | OUTPATIENT
Start: 2020-04-25 | End: 2020-04-28 | Stop reason: HOSPADM

## 2020-04-25 RX ORDER — LIDOCAINE HYDROCHLORIDE 20 MG/ML
INJECTION, SOLUTION EPIDURAL; INFILTRATION; INTRACAUDAL; PERINEURAL AS NEEDED
Status: DISCONTINUED | OUTPATIENT
Start: 2020-04-25 | End: 2020-04-25 | Stop reason: HOSPADM

## 2020-04-25 RX ORDER — MIDAZOLAM HYDROCHLORIDE 1 MG/ML
INJECTION, SOLUTION INTRAMUSCULAR; INTRAVENOUS AS NEEDED
Status: DISCONTINUED | OUTPATIENT
Start: 2020-04-25 | End: 2020-04-25 | Stop reason: HOSPADM

## 2020-04-25 RX ORDER — SODIUM CHLORIDE 0.9 % (FLUSH) 0.9 %
5-40 SYRINGE (ML) INJECTION AS NEEDED
Status: DISCONTINUED | OUTPATIENT
Start: 2020-04-25 | End: 2020-04-25 | Stop reason: HOSPADM

## 2020-04-25 RX ORDER — SODIUM CHLORIDE 0.9 % (FLUSH) 0.9 %
5-40 SYRINGE (ML) INJECTION AS NEEDED
Status: DISCONTINUED | OUTPATIENT
Start: 2020-04-25 | End: 2020-04-28 | Stop reason: HOSPADM

## 2020-04-25 RX ORDER — ONDANSETRON 2 MG/ML
INJECTION INTRAMUSCULAR; INTRAVENOUS AS NEEDED
Status: DISCONTINUED | OUTPATIENT
Start: 2020-04-25 | End: 2020-04-25

## 2020-04-25 RX ORDER — CELECOXIB 100 MG/1
200 CAPSULE ORAL 2 TIMES DAILY
Status: DISCONTINUED | OUTPATIENT
Start: 2020-04-25 | End: 2020-04-28 | Stop reason: HOSPADM

## 2020-04-25 RX ORDER — FLUOXETINE 10 MG/1
10 CAPSULE ORAL DAILY
Status: DISCONTINUED | OUTPATIENT
Start: 2020-04-26 | End: 2020-04-28 | Stop reason: HOSPADM

## 2020-04-25 RX ORDER — SODIUM CHLORIDE 9 MG/ML
INJECTION, SOLUTION INTRAVENOUS
Status: DISCONTINUED | OUTPATIENT
Start: 2020-04-25 | End: 2020-04-25 | Stop reason: HOSPADM

## 2020-04-25 RX ORDER — AMOXICILLIN 250 MG
2 CAPSULE ORAL 2 TIMES DAILY
Status: DISCONTINUED | OUTPATIENT
Start: 2020-04-25 | End: 2020-04-28 | Stop reason: HOSPADM

## 2020-04-25 RX ORDER — ONDANSETRON 2 MG/ML
4 INJECTION INTRAMUSCULAR; INTRAVENOUS
Status: DISCONTINUED | OUTPATIENT
Start: 2020-04-25 | End: 2020-04-28 | Stop reason: HOSPADM

## 2020-04-25 RX ORDER — GABAPENTIN 100 MG/1
300 CAPSULE ORAL 2 TIMES DAILY
Status: DISCONTINUED | OUTPATIENT
Start: 2020-04-25 | End: 2020-04-28 | Stop reason: HOSPADM

## 2020-04-25 RX ORDER — OXYCODONE AND ACETAMINOPHEN 5; 325 MG/1; MG/1
1 TABLET ORAL ONCE
Status: DISCONTINUED | OUTPATIENT
Start: 2020-04-25 | End: 2020-04-25 | Stop reason: HOSPADM

## 2020-04-25 RX ORDER — SODIUM CHLORIDE, SODIUM LACTATE, POTASSIUM CHLORIDE, CALCIUM CHLORIDE 600; 310; 30; 20 MG/100ML; MG/100ML; MG/100ML; MG/100ML
INJECTION, SOLUTION INTRAVENOUS
Status: DISCONTINUED | OUTPATIENT
Start: 2020-04-25 | End: 2020-04-25 | Stop reason: HOSPADM

## 2020-04-25 RX ORDER — INSULIN LISPRO 100 [IU]/ML
INJECTION, SOLUTION INTRAVENOUS; SUBCUTANEOUS ONCE
Status: DISCONTINUED | OUTPATIENT
Start: 2020-04-25 | End: 2020-04-25 | Stop reason: HOSPADM

## 2020-04-25 RX ORDER — ACETAMINOPHEN 325 MG/1
650 TABLET ORAL
Status: DISCONTINUED | OUTPATIENT
Start: 2020-04-25 | End: 2020-04-25

## 2020-04-25 RX ORDER — TRAMADOL HYDROCHLORIDE 50 MG/1
50 TABLET ORAL EVERY 8 HOURS
Status: DISCONTINUED | OUTPATIENT
Start: 2020-04-25 | End: 2020-04-28 | Stop reason: HOSPADM

## 2020-04-25 RX ORDER — ACETAMINOPHEN 325 MG/1
650 TABLET ORAL
Status: DISCONTINUED | OUTPATIENT
Start: 2020-04-25 | End: 2020-04-28 | Stop reason: HOSPADM

## 2020-04-25 RX ORDER — SODIUM CHLORIDE 9 MG/ML
75 INJECTION, SOLUTION INTRAVENOUS CONTINUOUS
Status: DISPENSED | OUTPATIENT
Start: 2020-04-25 | End: 2020-04-25

## 2020-04-25 RX ORDER — FENTANYL CITRATE 50 UG/ML
50 INJECTION, SOLUTION INTRAMUSCULAR; INTRAVENOUS AS NEEDED
Status: DISCONTINUED | OUTPATIENT
Start: 2020-04-25 | End: 2020-04-25 | Stop reason: HOSPADM

## 2020-04-25 RX ORDER — PROPOFOL 10 MG/ML
INJECTION, EMULSION INTRAVENOUS AS NEEDED
Status: DISCONTINUED | OUTPATIENT
Start: 2020-04-25 | End: 2020-04-25 | Stop reason: HOSPADM

## 2020-04-25 RX ORDER — SODIUM CHLORIDE, SODIUM LACTATE, POTASSIUM CHLORIDE, CALCIUM CHLORIDE 600; 310; 30; 20 MG/100ML; MG/100ML; MG/100ML; MG/100ML
125 INJECTION, SOLUTION INTRAVENOUS CONTINUOUS
Status: DISCONTINUED | OUTPATIENT
Start: 2020-04-25 | End: 2020-04-25 | Stop reason: HOSPADM

## 2020-04-25 RX ORDER — ONDANSETRON 2 MG/ML
4 INJECTION INTRAMUSCULAR; INTRAVENOUS ONCE
Status: DISCONTINUED | OUTPATIENT
Start: 2020-04-25 | End: 2020-04-25 | Stop reason: HOSPADM

## 2020-04-25 RX ORDER — CEFAZOLIN SODIUM 2 G/50ML
2 SOLUTION INTRAVENOUS
Status: COMPLETED | OUTPATIENT
Start: 2020-04-25 | End: 2020-04-25

## 2020-04-25 RX ORDER — DEXAMETHASONE SODIUM PHOSPHATE 4 MG/ML
INJECTION, SOLUTION INTRA-ARTICULAR; INTRALESIONAL; INTRAMUSCULAR; INTRAVENOUS; SOFT TISSUE AS NEEDED
Status: DISCONTINUED | OUTPATIENT
Start: 2020-04-25 | End: 2020-04-25 | Stop reason: HOSPADM

## 2020-04-25 RX ORDER — NALOXONE HYDROCHLORIDE 0.4 MG/ML
0.04 INJECTION, SOLUTION INTRAMUSCULAR; INTRAVENOUS; SUBCUTANEOUS AS NEEDED
Status: DISCONTINUED | OUTPATIENT
Start: 2020-04-25 | End: 2020-04-25 | Stop reason: HOSPADM

## 2020-04-25 RX ORDER — SODIUM CHLORIDE 0.9 % (FLUSH) 0.9 %
5-40 SYRINGE (ML) INJECTION EVERY 8 HOURS
Status: DISCONTINUED | OUTPATIENT
Start: 2020-04-25 | End: 2020-04-28 | Stop reason: HOSPADM

## 2020-04-25 RX ORDER — MAGNESIUM SULFATE 100 %
4 CRYSTALS MISCELLANEOUS AS NEEDED
Status: DISCONTINUED | OUTPATIENT
Start: 2020-04-25 | End: 2020-04-25 | Stop reason: HOSPADM

## 2020-04-25 RX ORDER — NALOXONE HYDROCHLORIDE 0.4 MG/ML
0.4 INJECTION, SOLUTION INTRAMUSCULAR; INTRAVENOUS; SUBCUTANEOUS AS NEEDED
Status: DISCONTINUED | OUTPATIENT
Start: 2020-04-25 | End: 2020-04-28 | Stop reason: HOSPADM

## 2020-04-25 RX ADMIN — Medication 10 ML: at 16:29

## 2020-04-25 RX ADMIN — FENTANYL CITRATE 50 MCG: 0.05 INJECTION, SOLUTION INTRAMUSCULAR; INTRAVENOUS at 10:11

## 2020-04-25 RX ADMIN — DEXTROSE MONOHYDRATE, SODIUM CHLORIDE, AND POTASSIUM CHLORIDE 100 ML/HR: 50; 4.5; 1.49 INJECTION, SOLUTION INTRAVENOUS at 00:55

## 2020-04-25 RX ADMIN — PHENYLEPHRINE HYDROCHLORIDE 100 MCG: 10 INJECTION INTRAVENOUS at 08:51

## 2020-04-25 RX ADMIN — Medication 10 ML: at 22:20

## 2020-04-25 RX ADMIN — ARFORMOTEROL TARTRATE 15 MCG: 15 SOLUTION RESPIRATORY (INHALATION) at 20:22

## 2020-04-25 RX ADMIN — CELECOXIB 200 MG: 100 CAPSULE ORAL at 17:09

## 2020-04-25 RX ADMIN — SODIUM CHLORIDE 75 ML/HR: 900 INJECTION, SOLUTION INTRAVENOUS at 11:38

## 2020-04-25 RX ADMIN — BUDESONIDE 500 MCG: 0.5 INHALANT RESPIRATORY (INHALATION) at 07:32

## 2020-04-25 RX ADMIN — HYDROMORPHONE HYDROCHLORIDE 1 MG: 1 INJECTION, SOLUTION INTRAMUSCULAR; INTRAVENOUS; SUBCUTANEOUS at 18:30

## 2020-04-25 RX ADMIN — SENNOSIDES AND DOCUSATE SODIUM 2 TABLET: 8.6; 5 TABLET ORAL at 17:09

## 2020-04-25 RX ADMIN — HYDROMORPHONE HYDROCHLORIDE 1 MG: 1 INJECTION, SOLUTION INTRAMUSCULAR; INTRAVENOUS; SUBCUTANEOUS at 08:41

## 2020-04-25 RX ADMIN — MIDAZOLAM 2 MG: 1 INJECTION INTRAMUSCULAR; INTRAVENOUS at 08:12

## 2020-04-25 RX ADMIN — MORPHINE SULFATE 2 MG: 2 INJECTION, SOLUTION INTRAMUSCULAR; INTRAVENOUS at 04:59

## 2020-04-25 RX ADMIN — DEXAMETHASONE SODIUM PHOSPHATE 4 MG: 4 INJECTION, SOLUTION INTRA-ARTICULAR; INTRALESIONAL; INTRAMUSCULAR; INTRAVENOUS; SOFT TISSUE at 08:33

## 2020-04-25 RX ADMIN — MORPHINE SULFATE 2 MG: 2 INJECTION, SOLUTION INTRAMUSCULAR; INTRAVENOUS at 00:55

## 2020-04-25 RX ADMIN — LIDOCAINE HYDROCHLORIDE 100 MG: 20 INJECTION, SOLUTION INTRAVENOUS at 08:18

## 2020-04-25 RX ADMIN — HYDROMORPHONE HYDROCHLORIDE 1 MG: 1 INJECTION, SOLUTION INTRAMUSCULAR; INTRAVENOUS; SUBCUTANEOUS at 14:32

## 2020-04-25 RX ADMIN — ACETAMINOPHEN 650 MG: 325 TABLET, FILM COATED ORAL at 00:24

## 2020-04-25 RX ADMIN — PHENYLEPHRINE HYDROCHLORIDE 100 MCG: 10 INJECTION INTRAVENOUS at 08:34

## 2020-04-25 RX ADMIN — SODIUM CHLORIDE: 900 INJECTION, SOLUTION INTRAVENOUS at 08:11

## 2020-04-25 RX ADMIN — PROPOFOL 160 MG: 10 INJECTION, EMULSION INTRAVENOUS at 08:18

## 2020-04-25 RX ADMIN — ONDANSETRON 4 MG: 2 SOLUTION INTRAMUSCULAR; INTRAVENOUS at 08:33

## 2020-04-25 RX ADMIN — FENTANYL CITRATE 25 MCG: 50 INJECTION, SOLUTION INTRAMUSCULAR; INTRAVENOUS at 08:30

## 2020-04-25 RX ADMIN — SODIUM CHLORIDE, SODIUM LACTATE, POTASSIUM CHLORIDE, AND CALCIUM CHLORIDE: 600; 310; 30; 20 INJECTION, SOLUTION INTRAVENOUS at 08:36

## 2020-04-25 RX ADMIN — Medication 100 MG: at 08:18

## 2020-04-25 RX ADMIN — TRAMADOL HYDROCHLORIDE 50 MG: 50 TABLET, FILM COATED ORAL at 22:19

## 2020-04-25 RX ADMIN — FENTANYL CITRATE 75 MCG: 50 INJECTION, SOLUTION INTRAMUSCULAR; INTRAVENOUS at 08:18

## 2020-04-25 RX ADMIN — CEFAZOLIN 2 G: 10 INJECTION, POWDER, FOR SOLUTION INTRAVENOUS at 07:51

## 2020-04-25 RX ADMIN — FENTANYL CITRATE 50 MCG: 0.05 INJECTION, SOLUTION INTRAMUSCULAR; INTRAVENOUS at 09:41

## 2020-04-25 RX ADMIN — GABAPENTIN 300 MG: 100 CAPSULE ORAL at 17:13

## 2020-04-25 RX ADMIN — HYDROMORPHONE HYDROCHLORIDE 0.5 MG: 1 INJECTION, SOLUTION INTRAMUSCULAR; INTRAVENOUS; SUBCUTANEOUS at 10:25

## 2020-04-25 RX ADMIN — ENOXAPARIN SODIUM 40 MG: 40 INJECTION SUBCUTANEOUS at 14:32

## 2020-04-25 RX ADMIN — BUDESONIDE 500 MCG: 0.5 INHALANT RESPIRATORY (INHALATION) at 20:22

## 2020-04-25 RX ADMIN — DEXTROSE MONOHYDRATE, SODIUM CHLORIDE, AND POTASSIUM CHLORIDE 100 ML/HR: 50; 4.5; 1.49 INJECTION, SOLUTION INTRAVENOUS at 15:59

## 2020-04-25 RX ADMIN — ARFORMOTEROL TARTRATE 15 MCG: 15 SOLUTION RESPIRATORY (INHALATION) at 07:32

## 2020-04-25 NOTE — PROGRESS NOTES
Received patient from [facility]. Pt awake and in bed. Denies pain at this time. No distress or discomfort noted. Bed locked in lowest position and call light within reach. 2219: night meds administered. Pt resting quietly in bed. Scheduled pain meds administered per order. See eMAR for mor details    0216: 2mg IV morphine administered for pain. See eMAr for more details  Uneventful night for pt. Pt rested and was treated per eMAR. No further complaints and no distress noted. 9716: Bedside shift change report given to Bertha PIRES (oncoming nurse) by Randa Jaimes RN (offgoing nurse). Report included the following information SBAR, Intake/Output, MAR and Quality Measures. Opportunity for questions and clarification provided.

## 2020-04-25 NOTE — OP NOTES
[facility]  OPERATIVE REPORT    Name:  Joseph Campos  MR#:   [id number]  :  1945  ACCOUNT #:  [de-identified]  DATE OF SERVICE:  2020    PREOPERATIVE DIAGNOSES:  Left intratrochanteric hip fracture and left ankle fracture. POSTOPERATIVE DIAGNOSIS:  Left intratrochanteric hip fracture and left ankle sprain equivalent. PROCEDURE PERFORMED:  1. Operative fixation of left hip fracture with short cephalomedullary nail. 2.  Evaluation of the left ankle under anesthesia. SURGEON:  Holger Thompson MD.    ASSISTANT:  Kwesi LITTLE CSA. ANESTHESIA:  General endotracheal.    COMPLICATIONS:  None. SPECIMENS REMOVED:  none. IMPLANTS:  Biomet 11 mm short Affixus nail with 105 mm lag screw and 90 mm anti-rotation screw plus distal interlocking screw. COUNTS:  Correct at the end of the case. CONDITION:  Stable to PACU.    ESTIMATED BLOOD LOSS:  100 mL. FINDINGS:  No gross instability of ankle. INDICATION AND HISTORY:  The patient is a 62-year-old who sustained a fall. She was admitted to the hospital.  X-rays revealed minimally displaced intratrochanteric fracture on the left plus a small fibular tip avulsion fracture on the left. Prior to surgery, I discussed the risks, benefits, potential complications and alternatives and she strongly wished to proceed with surgical treatment for this. DESCRIPTION FOR PROCEDURE:  The patient was identified, procedure was verified. After successful induction of anesthesia, the patient's left lower leg was prepped and draped. I performed a comprehensive time-out. Presurgical marking was clearly visible on the operative field. The patient was placed on the fracture table and modified Seldinger technique was utilized. I made an incision slightly proximal and posterior to the greater trochanter. I placed the guidewire at the tip of the trochanter and confirmed its position on AP and lateral radiographs.   It was inserted and then over-drilled. I inserted an 11 mm nail to an appropriate depth. I then made a small incision on the lateral side and placed a trocar for the lag screw. A guidewire was placed in to a slightly inferior-center and sightly posterior-center position for maximum bone strength. This was measured, drilled, and inserted and the set screw was tightened a backed off 1/4 turn to allow for dynamization without rotation. At this point, a second anti-rotation screw drilled and inserted - this was 15 mm shorter than lag screw. Once this was completed, a third small incision was made on the leg and the trocar for the distal interlocking screw was placed against the lateral femur. This was drilled, measured, and interlocking was inserted. The patient was awoken and transferred to PACU in stable condition after wounds were irrigated and closed. It should be noted that the ankle was stable with no gross instability. The splint that had been placed had been removed and was left off. POSTOPERATIVE PLAN:  Weightbearing as tolerated. We will give her a lace-up style ankle brace from tomorrow. I would like to see her back in the office in approximately 1 month for reevaluation with x-rays of the ankle and the hip on the left side. Lovenox ordered for DVT prophylaxis. Claritza Fields MD           Operative Report    Patient: Nino Guajardo MRN: [id number]  SSN: [id number]    YOB: 1945  Age: 69 y.o.   Sex: female       Date of Surgery: 4/25/2020     Preoperative Diagnosis: Type I or II open nondisplaced intertrochanteric fracture of left femur, initial encounter ([facility] [S72.145]     Postoperative Diagnosis: Type I or II open nondisplaced intertrochanteric fracture of left      Surgeon(s) and Role:     Andriy Dorantes MD - Primary    Anesthesia: General     Procedure: Procedure(s):  OPERATIVE REPAIR OF LEFT HIP FRACTURE; CLOSED TREATMENT OF LEFT ANKLE FRACTURE     Procedure in Detail: see above      Estimated Blood Loss:  100mL    Tourniquet Time: * No tourniquets in log *      Implants:   Implant Name Type Inv. Item Serial No.  Lot No. LRB No. Used Action   NAIL HFN  D [id number] -- AFFIXUS - [id number]  NAIL HFN  D [id number] -- AFFIXUS  [employer] [id number] Left 1 Implanted   SCR LAG HFN [id number] -- AFFIXUS - [id number]  SCR LAG HFN [id number] -- AFFIXUS  BIOMET TRAUMA [id number] Left 1 Implanted   SCR LAG HFN 10.5X90MM -- AFFIXUS - [id number]  SCR LAG HFN 10.5X90MM -- AFFIXUS  PAMEL[employer] TRAUMA [id number] Left 1 Implanted   CORTICAL BONE SCREW     [id number] Left 1 Implanted               Specimens: * No specimens in log *        Drains: None                Complications: None    Counts: Sponge and needle counts were correct times two.     Signed By:  Aaron Parks MD     April 26, 2020        BOLIVAR/GENEVIEVE_[place]_MAYDA/BC_CAD  D:  04/25/2020 9:43  T:  04/25/2020 19:13  JOB #:  [id number]

## 2020-04-25 NOTE — PROGRESS NOTES
Problem: Discharge Planning  Goal: *Discharge to safe environment  Outcome: Progressing Towards Goal    Plan:  [place] VS SNF    Reason for Admission:   Fall / L hip & L ankle fracture. OPERATIVE REPAIR OF LEFT HIP FRACTURE; CLOSED TREATMENT OF LEFT ANKLE FRACTURE            RUR Score:  9                   Plan for utilizing home health: Will assess for needs. PCP:First and Last name:  Dr. Rei Badillo   Name of Practice:    Are you a current patient: Yes/No: Yes   Approximate date of last visit:  Last seen about 3-4 months ago. Current Advanced Directive/Advance Care Plan:  Per pt has one naming her sister as decision maker, asked her to have her sister bring a copy for chart. Transition of Care Plan:  Home with [place] VS SNF depending on progress. Met with pt., verified all demographics. States has MCR/ ins. NOK:  Chase Landis, sister, lives across the street from pt. Lives alone. Has walker. Independent with ADL's prior to admit. Made pt aware that she may need SNF for rehab depending on progress. States if needs SNF wants [facility] as her 1st choice, states ok to send out to [facility] SNF's. Posted in cc link in epic & eb health. Will cont to follow. Lorraine Bernstein RN,ext [phone]. Patient has designated her sister to participate in his/her discharge plan and to receive any needed information. Name: Chase Landis  Address:  Phone number:  [phone]    Care Management Interventions  PCP Verified by CM: Yes(Dr. Britni Samano, last seen 3-4 months ago)  Palliative Care Criteria Met (RRAT>21 & CHF Dx)?: No  Mode of Transport at Discharge:  Other (see comment)(family)  Transition of Care Consult (CM Consult): Discharge Planning  Discharge Durable Medical Equipment: No  Physical Therapy Consult: No  Occupational Therapy Consult: No  Speech Therapy Consult: No  Current Support Network: Lives Alone  Confirm Follow Up Transport: Family  Discharge Location  Discharge Placement: Home with home health(VS SNF)

## 2020-04-25 NOTE — ANESTHESIA PREPROCEDURE EVALUATION
Relevant Problems   No relevant active problems       Anesthetic History   No history of anesthetic complications            Review of Systems / Medical History  Patient summary reviewed, nursing notes reviewed and pertinent labs reviewed    Pulmonary    COPD      Smoker      Comments:   + Hx/o Chronic Bronchitis   Neuro/Psych         Psychiatric history    Comments:   + Anxiety/Depression Cardiovascular              Hyperlipidemia         GI/Hepatic/Renal     GERD: well controlled           Endo/Other        Arthritis, cancer and anemia    Comments:   + Hx/o Uterine CA, S/P GISELLE  + Hx/o Gout Other Findings            Physical Exam    Airway  Mallampati: I  TM Distance: 4 - 6 cm  Neck ROM: normal range of motion   Mouth opening: Normal     Cardiovascular    Rhythm: regular  Rate: normal         Dental      Comments: Edentulous upper; only 5 teeth on lower arcade and they are broken/chipped; patient denies any being loose. ..    Pulmonary  Breath sounds clear to auscultation               Abdominal  GI exam deferred       Other Findings            Anesthetic Plan    ASA: 3  Anesthesia type: general          Induction: Intravenous  Anesthetic plan and risks discussed with: Patient

## 2020-04-25 NOTE — PROGRESS NOTES
Problem: Gas Exchange - Impaired  Goal: *Absence of hypoxia  Outcome: Progressing Towards Goal   Respiratory Therapy Assessment Care Plan    Patient:  Alvaro Phillips 69 y.o. female 4/25/2020 2:59 PM    Hip fracture ([address] Rachelle Ambriz      Chest X-RAY:   Results from Hospital Encounter encounter on 04/24/20   XR HIP LT W OR WO PELV 2-3 VWS    Impression IMPRESSION:    1. Intertrochanteric fracture in good alignment post ORIF. XR ANKLE LT MIN 3 V    Impression IMPRESSION:    Fracture of the distal fibular tip. XR FEMUR LT 2 V    Impression IMPRESSION:    1. Nondisplaced intratrochanteric fracture on the left.           Vital Signs:   Visit Vitals  /71   Pulse 86   Temp 98 °F (36.7 °C)   Resp 14   Ht 5' 6.5\" (1.689 m)   Wt 86.2 kg (190 lb)   SpO2 94%   BMI 30.21 kg/m²         Indications for treatment:HX:  COPD      Plan of care: Brovana Pulmicort BID        Goal:COPD Maintenance

## 2020-04-25 NOTE — PROGRESS NOTES
Internal Medicine Progress Note    Patient's Name: Oneida Alert Date: 4/24/2020  Length of Stay: 1      Assessment/Plan     Principal Problem:    Hip fracture ([facility] [address] (4/24/2020)    Active Problems:    COPD (chronic obstructive pulmonary disease) ([facility] [address] (4/24/2020)      - resume diet s/p surgery  - mobilization per ortho  - PRN pain control  - PRN zofran  - brovana/pulmicort  - monitor labs  - Cont acceptable home medications for chronic conditions   - DVT protocol    I have personally reviewed all pertinent labs and films that have officially resulted over the last 24 hours. I have personally checked for all pending labs that are awaiting final results. Anticipated discharge: 2 days    HPI     Clair Castro is a 69 y.o. female who fell at home as she was walking on her porch. Her feet got caught on aleah that was on the ground. She fell and landed on her left hip and ankle. She did not have chest pain. She did not have SOB. She reports that she \"just tripped. \"   She has known COPD but it is not in exacerbation. In the ER she is found to have left hip fracture. Hospital Course     She was cleared for surgery and taken to the OR on 4/25 for operative repair of L hip fx and closed treatment of L ankle fx. Subjective     Pt s/e @ bedside. No major events overnight. Pt offers no complaints other than being hungry. Denies CP or SOB. Denies abd pain, nvd.     Objective     Visit Vitals  /69   Pulse 85   Temp 97.9 °F (36.6 °C)   Resp 16   Ht 5' 6.5\" (1.689 m)   Wt 86.2 kg (190 lb)   SpO2 94%   BMI 30.21 kg/m²       Physical Exam:  General Appearance: NAD, conversant  HENT: normocephalic/atraumatic, moist mucus membranes  Neck: No JVD, supple  Lungs: CTA with normal respiratory effort  CV: RRR, no m/r/g  Extremities: no cyanosis, no peripheral edema  Neuro: No focal deficits, motor/sensory intact  Skin: Normal color, intact      Intake and Output:  Current Shift:  04/25 0701 - 04/25 1900  In: 250 [I.V.:250]  Out: 500 [Urine:500]  Last three shifts:  04/23 1901 - 04/25 0700  In: 2551.7 [P.O.:420; I.V.:2131.7]  Out: 2400 [Urine:2400]    Lab/Data Reviewed:  BMP: No results found for: NA, K, CL, CO2, AGAP, GLU, BUN, CREA, GFRAA, GFRNA  CBC:   Lab Results   Component Value Date/Time    WBC 9.1 04/25/2020 03:40 AM    HGB 10.0 (L) 04/25/2020 03:40 AM    HCT 31.1 (L) 04/25/2020 03:40 AM     04/25/2020 03:40 AM       Imaging Reviewed:  Xr Hip Lt W Or Wo Pelv 2-3 Vws    Result Date: 4/25/2020  CLINICAL HISTORY: Fracture post ORIF. Jethro Shelley COMPARISONS: Radiographs 4/24/2020 TECHNIQUE:  Three views, left hip ---------------------------------------------------- FINDINGS: BONES:  Intertrochanteric left hip fracture, in good alignment with slight impaction post ORIF. SOFT TISSUES:  Postsurgical soft tissue air lateral to the hip.   ----------------------------------------------------    IMPRESSION: 1. Intertrochanteric fracture in good alignment post ORIF.       Medications Reviewed:  Current Facility-Administered Medications   Medication Dose Route Frequency    0.9% sodium chloride infusion  75 mL/hr IntraVENous CONTINUOUS    sodium chloride (NS) flush 5-40 mL  5-40 mL IntraVENous Q8H    sodium chloride (NS) flush 5-40 mL  5-40 mL IntraVENous PRN    naloxone (NARCAN) injection 0.4 mg  0.4 mg IntraVENous PRN    acetaminophen (TYLENOL) tablet 650 mg  650 mg Oral Q4H PRN    traMADoL (ULTRAM) tablet 50 mg  50 mg Oral Q8H    celecoxib (CELEBREX) capsule 200 mg  200 mg Oral BID    HYDROmorphone (DILAUDID) injection 1 mg  1 mg IntraVENous Q4H PRN    gabapentin (NEURONTIN) capsule 300 mg  300 mg Oral BID    senna-docusate (PERICOLACE) 8.6-50 mg per tablet 2 Tab  2 Tab Oral BID    ondansetron (ZOFRAN) injection 4 mg  4 mg IntraVENous Q4H PRN    polyethylene glycol (MIRALAX) packet 17 g  17 g Oral DAILY    enoxaparin (LOVENOX) injection 40 mg  40 mg SubCUTAneous DAILY    allopurinoL (ZYLOPRIM) tablet 300 mg  300 mg Oral DAILY    dextrose 5% - 0.45% NaCl with KCl 20 mEq/L infusion  100 mL/hr IntraVENous CONTINUOUS    arformoteroL (BROVANA) neb solution 15 mcg  15 mcg Nebulization BID RT    And    budesonide (PULMICORT) 500 mcg/2 ml nebulizer suspension  500 mcg Nebulization BID RT    morphine injection 2 mg  2 mg IntraVENous Q4H PRN         Pillo Francisco PA-C  2 [facility]  Hospitalist Division  Office:  [phone]  Pager: [phone]

## 2020-04-25 NOTE — CONSULTS
Inpatient Orthopaedic Consult    CC:  Left ankle pain, left hip pain              HPI:  Aquiles Bowden is a 69 y.o. female who is going to undergo left femur cephalomedullary for IT femur fracture; patient also to undergo closed treatment of left ankle fracture. Patient states that she tripped from a standing height on her porch yesterday. Denies any other injuries. Denies blood thinner use. Previous treatment and diagnostic tests include ED imaging, left ankle splint. Denies hip or ankle pain prior to fall. History:  Past Medical History:   Diagnosis Date    Anxiety     Arthritis     Cancer ([address] 1979    uterine    Chronic bronchitis (HCC)     Chronic obstructive pulmonary disease (HCC)     Depression     Gout     Hyperlipemia     Menopause     Vitamin D deficiency      Past Surgical History:   Procedure Laterality Date    HX HEENT      mass removed from neck    HX HYSTERECTOMY       No Known Allergies      Review of Systems:    General: in no apparent distress, well developed and well nourished, non-toxic, in no respiratory distress and acyanotic, alert and oriented times 3  Musculoskeletal:  Left hip pain and left ankle pain    Physical Assessment:  Blood pressure 113/60, pulse 87, temperature 98.2 °F (36.8 °C), resp. rate 18, height 5' 6.5\" (1.689 m), weight 86.2 kg (190 lb), SpO2 94 %.     CBC w/Diff    Lab Results   Component Value Date/Time    WBC 9.1 04/25/2020 03:40 AM    RBC 3.00 (L) 04/25/2020 03:40 AM    HGB 10.0 (L) 04/25/2020 03:40 AM    HCT 31.1 (L) 04/25/2020 03:40 AM    .7 (H) 04/25/2020 03:40 AM    MCH 33.3 04/25/2020 03:40 AM    MCHC 32.2 04/25/2020 03:40 AM    RDW 14.3 04/25/2020 03:40 AM    Lab Results   Component Value Date/Time    MONOS 7 04/25/2020 03:40 AM    EOS 1 04/25/2020 03:40 AM    BASOS 0 04/25/2020 03:40 AM    RDW 14.3 04/25/2020 03:40 AM          Coagulation   Lab Results   Component Value Date    INR 1.2 04/24/2020            Exam:  General:   CAOx4,  Non toxic appearing   Extremities:  NO TTP noted overlying c-spine, RUE, LUE, RLE or pain with ROM. LLE - Neurovascular intact LLE. Compartments soft and NT distal to IT fracture site. Able to contract quadriceps. Able to wiggle toes. Palpable DP/PT pulses noted. Denies paresthesias           Radiology:   LEFT HIP  EXAM: XR PELV 1 OR 2 V, XR FEMUR LT 2 V     CLINICAL INDICATION/HISTORY: fall pain  -Additional: None     COMPARISON: None     TECHNIQUE: Single view of the pelvis as well as 2 views of the left femur     _______________     FINDINGS:     BONES: Images demonstrate a obliquely oriented nondisplaced intertrochanteric  fracture. Generalized bony demineralization. Mild enthesopathy. No additional  fractures. Alignment of the hip and knee are anatomic. The right hip is normally  aligned.     SOFT TISSUES: Within normal limits. No radiopaque foreign body.     _______________     IMPRESSION  IMPRESSION:     1. Nondisplaced intratrochanteric fracture on the left. LEFT ANKLE  EXAM: LEFT ANKLE RADIOGRAPHS      CLINICAL INDICATION/HISTORY: fall, pain  -Additional: None     COMPARISON: None     TECHNIQUE: 3 views of the left ankle     _______________     FINDINGS:     BONES: Fracture of the distal fibular tip. No other fracture identified. No area  of erosion or aggressive-appearing bone destruction.     SOFT TISSUES: Associated soft tissue swelling about the ankle.     _______________     IMPRESSION  IMPRESSION:     Fracture of the distal fibular tip. Assessment:   1. Left IT femur fracture, minimal displacement  2. Left distal fibula (tip avulsion) fracture     Plan:  1. NPO  2. OR today for left femur cephalomedullary nail  3. Closed treatment left ankle    Operative procedure discussion: I personally discussed treatment options with the patient and any available family.  We reviewed the details of the procedure, and risks of surgery to include complications of anesthesia, blood clots, infection which could involve further surgery including amputation, nerve and vessel injury. Patient and/or family agree to proceed with the recommended procedure and treatment plan. They verbally confirm that their questions have been answered. Written consent for procedure obtained and placed with the patient's chart. Thank you for allowing us to assist in this patient's care.   Suraj Drew PA-C  4/25/2020  Office: [phone]

## 2020-04-25 NOTE — BRIEF OP NOTE
Brief Postoperative Note    Patient: Pauline Larsen  YOB: 1945  MRN: [id number]    Date of Procedure: 4/25/2020     Pre-Op Diagnosis: Type I or II open nondisplaced intertrochanteric fracture of left femur, initial encounter ([facility] [S72.385P]    Post-Op Diagnosis: Same as preoperative diagnosis. Procedure(s):  OPERATIVE REPAIR OF LEFT HIP FRACTURE; CLOSED TREATMENT OF LEFT ANKLE FRACTURE    Surgeon(s):  Chel Gallegos MD    Surgical Assistant: None    Anesthesia: General     Estimated Blood Loss (mL): 912    Complications: None    Specimens: * No specimens in log *     Implants:   Implant Name Type Inv.  Item Serial No.  Lot No. LRB No. Used Action   NAIL HFN  D [id number] -- [employer] - [id number]  NAIL HFN  D [id number] -- [employer]  [employer] [id number] Left 1 Implanted   SCR LAG HFN [id number] -- [employer] - [id number]  SCR LAG HFN [id number] -- [employer]IX[employer]  BIOMET TRAUMA [id number] Left 1 Implanted   SCR LAG HFN 10.5X90MM -- [employer]IX[employer] - [id number]  SCR LAG HFN 10.5X90MM -- [employer]IXUS  [employer] [id number] Left 1 Implanted   CORTICAL BONE SCREW     [id number] Left 1 Implanted       Drains: * No LDAs found *    Findings: good alignment achieved    Electronically Signed by David Adams MD on 4/25/2020 at 9:15 AM

## 2020-04-25 NOTE — ANESTHESIA POSTPROCEDURE EVALUATION
Procedure(s):  OPERATIVE REPAIR OF LEFT HIP FRACTURE; CLOSED TREATMENT OF LEFT ANKLE FRACTURE.     general    Anesthesia Post Evaluation      Multimodal analgesia: multimodal analgesia used between 6 hours prior to anesthesia start to PACU discharge  Patient location during evaluation: PACU  Patient participation: complete - patient participated  Level of consciousness: sleepy but conscious  Pain score: 4  Pain management: adequate  Airway patency: patent  Anesthetic complications: no  Cardiovascular status: acceptable  Respiratory status: acceptable and nasal cannula  Hydration status: acceptable  Post anesthesia nausea and vomiting:  none      Vitals Value Taken Time   /46 4/25/2020  9:33 AM   Temp 36.9 °C (98.5 °F) 4/25/2020  9:33 AM   Pulse 91 4/25/2020  9:33 AM   Resp 19 4/25/2020  9:33 AM   SpO2 97 % 4/25/2020  9:33 AM

## 2020-04-25 NOTE — PERIOP NOTES
Recd care of pt from OR via bed. Attached to monitor. VSS. OR, MAR and anesthesia report acknowledged. Will cont to monitor.

## 2020-04-25 NOTE — PROGRESS NOTES
Assumed care of pt from Elie Wingconner, [facility] [facility]: Pt repositioned, linens changed. 2145: UA sent, pt agreeable to SCD to RLE, ICS provided. Dentures placed in container and labeled. [id number] Bliss with on-call MD Mickey Lynn, pt c/o h/a after morphine administration, see orders. Report given to [facility], RN. THALIA Mercer in room with pt at this time.

## 2020-04-25 NOTE — PROGRESS NOTES
1110-- Pt back from surgery. Pt in bed, alert, surgical site dressings intact with no sign of bleeding. 1140--Paged DR Dario Jay about pt diet order. Called received back to still maintain pt NPO status and hold all PO meds until later this evening. 1432--pain medication administered see (MAR). 1709--medication administered. Pt in bed, eating. No distress noted. 1830--pain medication given(MAR). 1939--Bedside and Verbal shift change report given to [address] [place] [address] (oncoming nurse) by Alexa Bautista RN (offgoing nurse). Report included the following information SBAR, Kardex, Intake/Output and MAR.

## 2020-04-25 NOTE — PROGRESS NOTES
Problem: Falls - Risk of  Goal: *Absence of Falls  Description: Document Thornton Nabor Fall Risk and appropriate interventions in the flowsheet. Outcome: Progressing Towards Goal  Note: Fall Risk Interventions:            Medication Interventions: Bed/chair exit alarm, Evaluate medications/consider consulting pharmacy, Patient to call before getting OOB, Teach patient to arise slowly    Elimination Interventions: Call light in reach, Patient to call for help with toileting needs, Toilet paper/wipes in reach    History of Falls Interventions: Bed/chair exit alarm, Door open when patient unattended, Investigate reason for fall, Evaluate medications/consider consulting pharmacy, Room close to nurse's station         Problem: Patient Education: Go to Patient Education Activity  Goal: Patient/Family Education  Outcome: Progressing Towards Goal     Problem: Pressure Injury - Risk of  Goal: *Prevention of pressure injury  Description: Document Darryn Scale and appropriate interventions in the flowsheet. Outcome: Progressing Towards Goal  Note: Pressure Injury Interventions: Activity Interventions: Pressure redistribution bed/mattress(bed type)    Mobility Interventions: HOB 30 degrees or less, Pressure redistribution bed/mattress (bed type), Assess need for specialty bed, Turn and reposition approx.  every two hours(pillow and wedges)    Nutrition Interventions: Document food/fluid/supplement intake, Offer support with meals,snacks and hydration    Friction and Shear Interventions: Lift sheet, Minimize layers, Apply protective barrier, creams and emollients, Feet elevated on foot rest, HOB 30 degrees or less, Foam dressings/transparent film/skin sealants                Problem: Patient Education: Go to Patient Education Activity  Goal: Patient/Family Education  Outcome: Progressing Towards Goal

## 2020-04-25 NOTE — PROGRESS NOTES
Bedside shift change report given to Omaira RN (oncoming nurse) by Claribel Hurst RN (offgoing nurse). Report included the following information SBAR, Kardex, Intake/Output, MAR and Recent Results. 0732 -- Neb treatment admin'd and EKG completed per Dr. Bianca Werner. 0800 -- Patient off the unit to OR.

## 2020-04-25 NOTE — PROGRESS NOTES
Problem: Falls - Risk of  Goal: *Absence of Falls  Description: Document Ritter Reason Fall Risk and appropriate interventions in the flowsheet. 4/25/2020 1510 by Bg Moreno  Outcome: Progressing Towards Goal  Note: Fall Risk Interventions:            Medication Interventions: Evaluate medications/consider consulting pharmacy    Elimination Interventions: Call light in reach, Patient to call for help with toileting needs    History of Falls Interventions: Bed/chair exit alarm, Door open when patient unattended, Investigate reason for fall, Evaluate medications/consider consulting pharmacy, Room close to nurse's station      4/25/2020 1507 by Bg Moreno  Outcome: Progressing Towards Goal  Note: Fall Risk Interventions:            Medication Interventions: Evaluate medications/consider consulting pharmacy    Elimination Interventions: Call light in reach, Patient to call for help with toileting needs    History of Falls Interventions: Bed/chair exit alarm, Door open when patient unattended, Investigate reason for fall, Evaluate medications/consider consulting pharmacy, Room close to nurse's station         Problem: Pressure Injury - Risk of  Goal: *Prevention of pressure injury  Description: Document Darryn Scale and appropriate interventions in the flowsheet. 4/25/2020 1510 by Oli CUEVAS  Outcome: Progressing Towards Goal  Note: Pressure Injury Interventions:             Activity Interventions: Pressure redistribution bed/mattress(bed type)    Mobility Interventions: Pressure redistribution bed/mattress (bed type)    Nutrition Interventions: Document food/fluid/supplement intake    Friction and Shear Interventions: Lift sheet, Minimize layers, Apply protective barrier, creams and emollients, Feet elevated on foot rest, HOB 30 degrees or less, Foam dressings/transparent film/skin sealants             4/25/2020 1507 by Oli CUEVAS  Outcome: Progressing Towards Goal  Note: Pressure Injury Interventions: Activity Interventions: Pressure redistribution bed/mattress(bed type)    Mobility Interventions: Pressure redistribution bed/mattress (bed type)    Nutrition Interventions: Document food/fluid/supplement intake    Friction and Shear Interventions: Lift sheet, Minimize layers, Apply protective barrier, creams and emollients, Feet elevated on foot rest, HOB 30 degrees or less, Foam dressings/transparent film/skin sealants                Problem: Pain  Goal: *Control of Pain  Outcome: Progressing Towards Goal     Problem: Gas Exchange - Impaired  Goal: *Absence of hypoxia  4/25/2020 1510 by Etta CUEVAS  Outcome: Progressing Towards Goal  4/25/2020 1507 by Etta CUEVAS  Outcome: Progressing Towards Goal     Problem: Pain  Goal: *Control of Pain  Outcome: Progressing Towards Goal

## 2020-04-26 LAB
ANION GAP SERPL CALC-SCNC: 5 MMOL/L (ref 3–18)
BUN SERPL-MCNC: 10 MG/DL (ref 7–18)
BUN/CREAT SERPL: 14 (ref 12–20)
CALCIUM SERPL-MCNC: 7.7 MG/DL (ref 8.5–10.1)
CHLORIDE SERPL-SCNC: 107 MMOL/L (ref 100–111)
CO2 SERPL-SCNC: 25 MMOL/L (ref 21–32)
CREAT SERPL-MCNC: 0.71 MG/DL (ref 0.6–1.3)
ERYTHROCYTE [DISTWIDTH] IN BLOOD BY AUTOMATED COUNT: 14.2 % (ref 11.6–14.5)
GLUCOSE SERPL-MCNC: 149 MG/DL (ref 74–99)
HCT VFR BLD AUTO: 28.1 % (ref 35–45)
HGB BLD-MCNC: 8.9 G/DL (ref 12–16)
MCH RBC QN AUTO: 32.7 PG (ref 24–34)
MCHC RBC AUTO-ENTMCNC: 31.7 G/DL (ref 31–37)
MCV RBC AUTO: 103.3 FL (ref 74–97)
PLATELET # BLD AUTO: 308 K/UL (ref 135–420)
PMV BLD AUTO: 10.5 FL (ref 9.2–11.8)
POTASSIUM SERPL-SCNC: 5 MMOL/L (ref 3.5–5.5)
RBC # BLD AUTO: 2.72 M/UL (ref 4.2–5.3)
SODIUM SERPL-SCNC: 137 MMOL/L (ref 136–145)
WBC # BLD AUTO: 9.2 K/UL (ref 4.6–13.2)

## 2020-04-26 PROCEDURE — 36415 COLL VENOUS BLD VENIPUNCTURE: CPT

## 2020-04-26 PROCEDURE — 0QS734Z REPOSITION LEFT UPPER FEMUR WITH INTERNAL FIXATION DEVICE, PERCUTANEOUS APPROACH: ICD-10-PCS | Performed by: ORTHOPAEDIC SURGERY

## 2020-04-26 PROCEDURE — 74011250637 HC RX REV CODE- 250/637: Performed by: PHYSICIAN ASSISTANT

## 2020-04-26 PROCEDURE — 74011250637 HC RX REV CODE- 250/637: Performed by: ORTHOPAEDIC SURGERY

## 2020-04-26 PROCEDURE — 85027 COMPLETE CBC AUTOMATED: CPT

## 2020-04-26 PROCEDURE — 74011250636 HC RX REV CODE- 250/636: Performed by: HOSPITALIST

## 2020-04-26 PROCEDURE — 65270000029 HC RM PRIVATE

## 2020-04-26 PROCEDURE — 74011250637 HC RX REV CODE- 250/637: Performed by: HOSPITALIST

## 2020-04-26 PROCEDURE — 94761 N-INVAS EAR/PLS OXIMETRY MLT: CPT

## 2020-04-26 PROCEDURE — 80048 BASIC METABOLIC PNL TOTAL CA: CPT

## 2020-04-26 PROCEDURE — 74011000250 HC RX REV CODE- 250: Performed by: HOSPITALIST

## 2020-04-26 PROCEDURE — 74011250636 HC RX REV CODE- 250/636: Performed by: ORTHOPAEDIC SURGERY

## 2020-04-26 PROCEDURE — 97530 THERAPEUTIC ACTIVITIES: CPT

## 2020-04-26 PROCEDURE — 94640 AIRWAY INHALATION TREATMENT: CPT

## 2020-04-26 PROCEDURE — 77010033678 HC OXYGEN DAILY

## 2020-04-26 PROCEDURE — 97161 PT EVAL LOW COMPLEX 20 MIN: CPT

## 2020-04-26 RX ADMIN — GABAPENTIN 300 MG: 100 CAPSULE ORAL at 08:46

## 2020-04-26 RX ADMIN — GABAPENTIN 300 MG: 100 CAPSULE ORAL at 17:19

## 2020-04-26 RX ADMIN — TRAMADOL HYDROCHLORIDE 50 MG: 50 TABLET, FILM COATED ORAL at 05:51

## 2020-04-26 RX ADMIN — TRAMADOL HYDROCHLORIDE 50 MG: 50 TABLET, FILM COATED ORAL at 14:23

## 2020-04-26 RX ADMIN — FLUOXETINE 10 MG: 10 CAPSULE ORAL at 08:47

## 2020-04-26 RX ADMIN — Medication 10 ML: at 22:11

## 2020-04-26 RX ADMIN — BUDESONIDE 500 MCG: 0.5 INHALANT RESPIRATORY (INHALATION) at 09:05

## 2020-04-26 RX ADMIN — MORPHINE SULFATE 2 MG: 2 INJECTION, SOLUTION INTRAMUSCULAR; INTRAVENOUS at 02:16

## 2020-04-26 RX ADMIN — ENOXAPARIN SODIUM 40 MG: 40 INJECTION SUBCUTANEOUS at 08:46

## 2020-04-26 RX ADMIN — ARFORMOTEROL TARTRATE 15 MCG: 15 SOLUTION RESPIRATORY (INHALATION) at 20:17

## 2020-04-26 RX ADMIN — Medication 10 ML: at 14:53

## 2020-04-26 RX ADMIN — CELECOXIB 200 MG: 100 CAPSULE ORAL at 17:19

## 2020-04-26 RX ADMIN — POLYETHYLENE GLYCOL 3350 17 G: 17 POWDER, FOR SOLUTION ORAL at 08:46

## 2020-04-26 RX ADMIN — DEXTROSE MONOHYDRATE, SODIUM CHLORIDE, AND POTASSIUM CHLORIDE 100 ML/HR: 50; 4.5; 1.49 INJECTION, SOLUTION INTRAVENOUS at 14:52

## 2020-04-26 RX ADMIN — ARFORMOTEROL TARTRATE 15 MCG: 15 SOLUTION RESPIRATORY (INHALATION) at 09:05

## 2020-04-26 RX ADMIN — ALLOPURINOL 300 MG: 100 TABLET ORAL at 08:47

## 2020-04-26 RX ADMIN — SENNOSIDES AND DOCUSATE SODIUM 2 TABLET: 8.6; 5 TABLET ORAL at 08:47

## 2020-04-26 RX ADMIN — Medication 10 ML: at 05:52

## 2020-04-26 RX ADMIN — SENNOSIDES AND DOCUSATE SODIUM 2 TABLET: 8.6; 5 TABLET ORAL at 17:19

## 2020-04-26 RX ADMIN — DEXTROSE MONOHYDRATE, SODIUM CHLORIDE, AND POTASSIUM CHLORIDE 100 ML/HR: 50; 4.5; 1.49 INJECTION, SOLUTION INTRAVENOUS at 02:12

## 2020-04-26 RX ADMIN — TRAMADOL HYDROCHLORIDE 50 MG: 50 TABLET, FILM COATED ORAL at 22:09

## 2020-04-26 RX ADMIN — BUDESONIDE 500 MCG: 0.5 INHALANT RESPIRATORY (INHALATION) at 20:17

## 2020-04-26 RX ADMIN — CELECOXIB 200 MG: 100 CAPSULE ORAL at 08:46

## 2020-04-26 NOTE — PROGRESS NOTES
Internal Medicine Progress Note    Patient's Name: Ursula Perez Date: 4/24/2020  Length of Stay: 2      Assessment/Plan     Principal Problem:    Hip fracture ([facility] [address] (4/24/2020)    Active Problems:    COPD (chronic obstructive pulmonary disease) ([facility] [address] (4/24/2020)      - resume diet s/p surgery  - mobilization per ortho; WBAT LLE including ankle, PT/OT ordered  - PRN pain control  - PRN zofran  - brovana/pulmicort  - monitor labs  - Cont acceptable home medications for chronic conditions   - DVT protocol    I have personally reviewed all pertinent labs and films that have officially resulted over the last 24 hours. I have personally checked for all pending labs that are awaiting final results. Anticipated discharge: 2 days    HPI     Woodward Meckel is a 69 y.o. female who fell at home as she was walking on her porch. Her feet got caught on aleha that was on the ground. She fell and landed on her left hip and ankle. She did not have chest pain. She did not have SOB. She reports that she \"just tripped. \"   She has known COPD but it is not in exacerbation. In the ER she is found to have left hip fracture. Hospital Course     She was cleared for surgery and taken to the OR on 4/25 for operative repair of L hip fx and closed treatment of L ankle fx. PT/OT ordered. Subjective     Pt s/e @ bedside. No major events overnight. Pt offers no complaints this AM. Denies CP or SOB. Denies abd pain, nvd.     Objective     Visit Vitals  /59   Pulse 77   Temp 97.5 °F (36.4 °C)   Resp 20   Ht 5' 6.5\" (1.689 m)   Wt 86.2 kg (190 lb)   SpO2 95%   BMI 30.21 kg/m²       Physical Exam:  General Appearance: NAD, conversant  HENT: normocephalic/atraumatic, moist mucus membranes  Neck: No JVD, supple  Lungs: CTA with normal respiratory effort  CV: RRR, no m/r/g  Extremities: no cyanosis, no peripheral edema, large bruise over lateral L ankle/foot  Neuro: No focal deficits, motor/sensory intact  Skin: Normal color, intact      Intake and Output:  Current Shift:  04/26 0701 - 04/26 1900  In: 600 [P.O.:600]  Out: -   Last three shifts:  04/24 1901 - 04/26 0700  In: 6711.7 [P.O.:1690; I.V.:5021.7]  Out: 5000 [Urine:5000]    Lab/Data Reviewed:  BMP:   Lab Results   Component Value Date/Time     04/26/2020 03:45 AM    K 5.0 04/26/2020 03:45 AM     04/26/2020 03:45 AM    CO2 25 04/26/2020 03:45 AM    AGAP 5 04/26/2020 03:45 AM     (H) 04/26/2020 03:45 AM    BUN 10 04/26/2020 03:45 AM    CREA 0.71 04/26/2020 03:45 AM    GFRAA >60 04/26/2020 03:45 AM    GFRNA >60 04/26/2020 03:45 AM     CBC:   Lab Results   Component Value Date/Time    WBC 9.2 04/26/2020 03:45 AM    HGB 8.9 (L) 04/26/2020 03:45 AM    HCT 28.1 (L) 04/26/2020 03:45 AM     04/26/2020 03:45 AM       Imaging Reviewed:  No results found.     Medications Reviewed:  Current Facility-Administered Medications   Medication Dose Route Frequency    sodium chloride (NS) flush 5-40 mL  5-40 mL IntraVENous Q8H    sodium chloride (NS) flush 5-40 mL  5-40 mL IntraVENous PRN    naloxone (NARCAN) injection 0.4 mg  0.4 mg IntraVENous PRN    acetaminophen (TYLENOL) tablet 650 mg  650 mg Oral Q4H PRN    traMADoL (ULTRAM) tablet 50 mg  50 mg Oral Q8H    celecoxib (CELEBREX) capsule 200 mg  200 mg Oral BID    HYDROmorphone (DILAUDID) injection 1 mg  1 mg IntraVENous Q4H PRN    gabapentin (NEURONTIN) capsule 300 mg  300 mg Oral BID    senna-docusate (PERICOLACE) 8.6-50 mg per tablet 2 Tab  2 Tab Oral BID    ondansetron (ZOFRAN) injection 4 mg  4 mg IntraVENous Q4H PRN    polyethylene glycol (MIRALAX) packet 17 g  17 g Oral DAILY    enoxaparin (LOVENOX) injection 40 mg  40 mg SubCUTAneous DAILY    FLUoxetine (PROzac) capsule 10 mg  10 mg Oral DAILY    allopurinoL (ZYLOPRIM) tablet 300 mg  300 mg Oral DAILY    dextrose 5% - 0.45% NaCl with KCl 20 mEq/L infusion  100 mL/hr IntraVENous CONTINUOUS    arformoteroL (BROVANA) neb solution 15 mcg  15 mcg Nebulization BID RT    And    budesonide (PULMICORT) 500 mcg/2 ml nebulizer suspension  500 mcg Nebulization BID RT    morphine injection 2 mg  2 mg IntraVENous Q4H PRN         Abigail Gilliam PA-C  2 [facility]  Hospitalist Division  Office:  [phone]  Pager: [phone]

## 2020-04-26 NOTE — PROGRESS NOTES
Problem: Falls - Risk of  Goal: *Absence of Falls  Description: Document Hawk Chua Fall Risk and appropriate interventions in the flowsheet. Outcome: Progressing Towards Goal  Note: Fall Risk Interventions:  Mobility Interventions: PT Consult for mobility concerns         Medication Interventions: Patient to call before getting OOB    Elimination Interventions: Call light in reach    History of Falls Interventions: Investigate reason for fall         Problem: Patient Education: Go to Patient Education Activity  Goal: Patient/Family Education  Outcome: Progressing Towards Goal     Problem: Pressure Injury - Risk of  Goal: *Prevention of pressure injury  Description: Document Darryn Scale and appropriate interventions in the flowsheet. Outcome: Progressing Towards Goal  Note: Pressure Injury Interventions:             Activity Interventions: Increase time out of bed, Pressure redistribution bed/mattress(bed type)    Mobility Interventions: HOB 30 degrees or less, Pressure redistribution bed/mattress (bed type)    Nutrition Interventions: Offer support with meals,snacks and hydration    Friction and Shear Interventions: Minimize layers                Problem: Patient Education: Go to Patient Education Activity  Goal: Patient/Family Education  Outcome: Progressing Towards Goal     Problem: Discharge Planning  Goal: *Discharge to safe environment  Outcome: Progressing Towards Goal     Problem: Gas Exchange - Impaired  Goal: *Absence of hypoxia  Outcome: Progressing Towards Goal     Problem: Pain  Goal: *Control of Pain  Outcome: Progressing Towards Goal  Goal: *PALLIATIVE CARE:  Alleviation of Pain  Outcome: Progressing Towards Goal     Problem: Patient Education: Go to Patient Education Activity  Goal: Patient/Family Education  Outcome: Progressing Towards Goal

## 2020-04-26 NOTE — PROGRESS NOTES
Progress Note      Post Operative Day: 1    Assessment:    1. Status post left cephalomedullary nail, Progressing. 2. Left ankle sprain equivalent      PLAN:    1. Mobilize. Continue P.T.  2. WBAT LLE including ankle  3. Lovenox for DVT prophylaxis  4. Discharge Planning. HPI: Abner Latif is a 69 y.o. female patient without new orthopaedic changes. Blood pressure 104/59, pulse 77, temperature 97.5 °F (36.4 °C), resp. rate 20, height 5' 6.5\" (1.689 m), weight 86.2 kg (190 lb), SpO2 94 %. CBC w/Diff   Lab Results   Component Value Date/Time    WBC 9.2 04/26/2020 03:45 AM    RBC 2.72 (L) 04/26/2020 03:45 AM    HGB 8.9 (L) 04/26/2020 03:45 AM    HCT 28.1 (L) 04/26/2020 03:45 AM    .3 (H) 04/26/2020 03:45 AM    MCH 32.7 04/26/2020 03:45 AM    MCHC 31.7 04/26/2020 03:45 AM    RDW 14.2 04/26/2020 03:45 AM    Lab Results   Component Value Date/Time    MONOS 7 04/25/2020 03:40 AM    EOS 1 04/25/2020 03:40 AM    BASOS 0 04/25/2020 03:40 AM    RDW 14.2 04/26/2020 03:45 AM             Physical Assessment:  General: in no apparent distress, well developed and well nourished, non-toxic, in no respiratory distress and acyanotic, alert and oriented times 3   Wound: no drainage   Extremities:  Neurovascular intact LLE. Compartments soft and NT distal to surgical site. Able to contract quadriceps. Plantar and dorsiflexion intact; foot edema noted. Palpable DP/PT pulses noted. Denies paresthesias    Dressing:  CDI   DVT Exam:   No exam evidence to suggest DVT. Compartments soft and NT.           Radiology: no new ortho studies          - Lee Headley, [address] [place] [address], PA-  4/26/2020    Office [phone]

## 2020-04-26 NOTE — PROGRESS NOTES
Bedside shift change report given to Bertha RN (oncoming nurse) by Jaren Hall RN (offgoing nurse). Report included the following information SBAR, Kardex, Intake/Output, MAR and Recent Results.      0847 -- AM medications given, well tolerated, will continue to monitor.      1100 -- Reassessment completed, no change in patient condition, will continue to monitor.      1524 -- Reassessment completed, no change in patient condition, will continue to monitor.      Bedside shift change report given to [facility], RN (oncoming nurse) by Hai West RN (offgoing nurse). Report included the following information SBAR, Kardex, Intake/Output, MAR and Recent Results.

## 2020-04-26 NOTE — PROGRESS NOTES
Bedside report received from [address] [place] [address]    2015: pt watching tv, AOX4, denies pain, on 2L O2 nc, dressing dry and intact, shift assessment completed, bed locked and low position, call bell within reach    2209: schedule medication given    0015: pt resting in bed, no complain voiced, no change from previous assessment    0218: sleeping, IVF infusing well    0415: pt sleeping, no distress noted    0510: Reddy and Mami care provided    3722: pt resting in bed, dressing dry and intact    Bedside and Verbal shift change report given to Yoandy Pichardo RN (oncoming nurse) by Danielle Grewal RN (offgoing nurse). Report included the following information SBAR, Kardex, ED Summary, Intake/Output, MAR and Recent Results.

## 2020-04-26 NOTE — PROGRESS NOTES
Respiratory Therapy Assessment Care Plan    Patient:  Aquiles Bowden 69 y.o. female 4/26/2020 10:37 AM    Hip fracture [facility]      Chest X-RAY:   Results from Hospital Encounter encounter on 04/24/20   XR HIP LT W OR WO PELV 2-3 VWS    Impression IMPRESSION:    1. Intertrochanteric fracture in good alignment post ORIF. XR ANKLE LT MIN 3 V    Impression IMPRESSION:    Fracture of the distal fibular tip. XR FEMUR LT 2 V    Impression IMPRESSION:    1. Nondisplaced intratrochanteric fracture on the left.           Vital Signs:   Visit Vitals  /59   Pulse 77   Temp 97.5 °F (36.4 °C)   Resp 20   Ht 5' 6.5\" (1.689 m)   Wt 86.2 kg (190 lb)   SpO2 95%   BMI 30.21 kg/m²         Indications for treatment: Hx:  COPD      Plan of care: Pulmicort/Brovana BID/Oxygen Therapy        Goal: Improved WOB/ Titrate FIO2

## 2020-04-26 NOTE — PROGRESS NOTES
Problem: Mobility Impaired (Adult and Pediatric)  Goal: *Acute Goals and Plan of Care (Insert Text)  Description: Physical Therapy Goals  Initiated 4/26/2020 and to be accomplished within 7 day(s)  1. Patient will move from supine to sit and sit to supine in bed with minimal assistance/contact guard assist in order to facilitate eating meals in sitting. 2.  Patient will transfer from bed to chair and chair to bed with minimal assistance/contact guard assist using the least restrictive device in order to facilitate participation in sitting ADLs. 3.  Patient will perform sit to stand with minimal assistance/contact guard assist in order to prepare for standing ADLs. 4.  Patient will ambulate with minimal assistance/contact guard assist for 50 feet with the least restrictive device in order to facilitate improved ease with ambulation to the restroom. 5.  Patient will ascend/descend 2 stairs with handrail(s) with moderate assistance  in order to prepare patient to safely enter residence. Outcome: Progressing Towards Goal   PHYSICAL THERAPY EVALUATION    Patient: Natalee Kaur (10 y.o. female)  Date: 4/26/2020  Primary Diagnosis: Hip fracture ([facility] [address] [S72.009A]  Procedure(s) (LRB):  OPERATIVE REPAIR OF LEFT HIP FRACTURE; CLOSED TREATMENT OF LEFT ANKLE FRACTURE (Left) 1 Day Post-Op   Precautions:  Fall, WBAT(L LE)    PLOF: Mod I with intermittent use of Rollator for ambulation     ASSESSMENT :  Patient is a 69 y.o. female who presents to Physical Therapy with diagnosis of Hip fracture ([facility] [address] [S72.009A]. Nurse, Bertha, consulted prior to session. Patient is supine in bed with SpO2 via NC and L LE elevation and agrees to participate in PT services. Upon observation patient demonstrates increased bimalleolar L ankle swelling.  Upon objective evaluation, patient demonstrated decreased L LE AROM in hip/knee flexion and ankle DF, grossly impaired L LE strength in (L Psoas 2-/5, quad 2+/5, ham 2-/5, and ant tib 2+/5), impaired L4/5 L LE light touch sensation, and poor standing static and dynamic balance. Patient required mod A for supine to sit and sit to stand - with VCs to avoid pulling into standing on RW for safety. Patient stood for ~1 min with increased weight shift to R LE and L LE WBAT (self-limiting due to pain) and constant BRYAN UE support from RW and min A for safety. Patient declined ambulation due to L hip pain despite max encouragement. Min A for L LE mobility sit --> supine lying. Pt participation in supine therex to include BRYAN Ankle pump x10 active and BRYAN QS/GS 5x5\". Patient requires between moderate assistance and minimal assistance/contact guard assist for bed mobility, transfers and ambulation. Patient educated on L hip post surgical precautions in order to facilitate safe functional mobility and regular performance of BRYAN ankle pumps to facilitate blood floor to BRYAN LEs during prolonged inactivity. Patient left supine in bed with L LE elevation and all needs within reach. Increased c/o L LE throbbing and pain - RN Bertha aware. Patient can benefit from skilled PT interventions to decrease r/o falls and improve BRYAN LE strength, increase walking/standing tolerance, and improve body mechanics mechanics with bed mobility and transfers to facilitate ADL's & overall functional status/quality of life. Patient will benefit from skilled intervention to address the above impairments.   Patient's rehabilitation potential is considered to be Fair  Factors which may influence rehabilitation potential include:   []         None noted  []         Mental ability/status  [x]         Medical condition  [x]         Home/family situation and support systems  []         Safety awareness  []         Pain tolerance/management  []         Other:      PLAN :  Recommendations and Planned Interventions:   [x]           Bed Mobility Training             [x]    Neuromuscular Re-Education  [x]           Transfer Training []    Orthotic/Prosthetic Training  [x]           Gait Training                          []    Modalities  [x]           Therapeutic Exercises           []    Edema Management/Control  [x]           Therapeutic Activities            [x]    Family Training/Education  [x]           Patient Education  []           Other (comment):    Frequency/Duration: Patient will be followed by physical therapy 4-7x per week to address goals. Discharge Recommendations: Home Health  Further Equipment Recommendations for Discharge: bedside commode and rolling walker     SUBJECTIVE:   Patient stated I'm doing fine.     OBJECTIVE DATA SUMMARY:     Past Medical History:   Diagnosis Date    Anxiety     Arthritis     Cancer ([facility] [address] 1979    uterine    Chronic bronchitis (HCC)     Chronic obstructive pulmonary disease (HCC)     Depression     GERD (gastroesophageal reflux disease)     Gout     Hyperlipemia     Menopause     Vitamin D deficiency      Past Surgical History:   Procedure Laterality Date    HX HEENT      mass removed from neck    HX HYSTERECTOMY       Barriers to Learning/Limitations: None  Compensate with: N/A  Home Situation:  Home Situation  Home Environment: Private residence  # Steps to Enter: 2  Rails to Enter: Yes  Hand Rails : Bilateral  One/Two Story Residence: One story  Living Alone: Yes  Support Systems: Friends \ neighbors  Patient Expects to be Discharged to[de-identified] Private residence  Current DME Used/Available at Home: Walker, rollator  Critical Behavior:  Neurologic State: Alert  Orientation Level: Oriented X4  Cognition: Follows commands  Safety/Judgement: Fall prevention  Psychosocial  Patient Behaviors: Appropriate for age; Cooperative  Needs Expressed: Educational  Purposeful Interaction: Yes  Pt Identified Daily Priority: Clinical issues (comment)  Caritas Process: Nurture loving kindness;Establish trust;Attend basic human needs;Create healing environment; Teaching/learning  Caring Interventions: Reassure; Therapeutic modalities  Reassure: Informing;Caring rounds  Skin Condition/Temp: Warm;Dry     Skin Integrity: Incision (comment)(left hip)  Skin Integumentary  Skin Color: Appropriate for ethnicity  Skin Condition/Temp: Warm;Dry  Skin Integrity: Incision (comment)(left hip)  Turgor: Non-tenting  Hair Growth: Present  Varicosities: Absent     Strength:    Strength: Generally decreased, functional(R/L psoas 4-/2-, ham 4/2-, quad 5-/2+, ant tib 5/2)    Tone & Sensation:     Sensation: Impaired(R WNL, L decreased L4/5 Light touch sensation)    Range Of Motion:  AROM: Generally decreased, functional(R WNL, L AROM decr in hip/knee flexion & ankle DF)    Functional Mobility:  Bed Mobility:  Rolling: Contact guard assistance  Supine to Sit: Moderate assistance(Assist with trunk and L LE mobility)  Sit to Supine: Minimum assistance(Assist with L LE mobility)  Scooting: Minimum assistance(to scoot EOB)  Transfers:  Sit to Stand: Moderate assistance  Stand to Sit: Minimum assistance    Balance:   Sitting: Impaired; With support  Sitting - Static: Fair (occasional)  Sitting - Dynamic: Fair (occasional)  Standing: Impaired; With support  Standing - Static: Constant support;Poor  Ambulation/Gait Training:    Left Side Weight Bearing: As tolerated    Therapeutic Exercises: BRYAN Ankle pump x10 active and BRYAN QS/GS 5x5\"    Pain:  Pain level pre-treatment: 0/10   Pain level post-treatment: 6/10   Pain Intervention(s) : Medication (see MAR); Rest and Repositioning  Response to intervention: Nurse notified, See doc flow - Bertha    Activity Tolerance: Fair, limited by L LE pain   Please refer to the flowsheet for vital signs taken during this treatment.   After treatment:   []         Patient left in no apparent distress sitting up in chair  [x]         Patient left in no apparent distress in bed w/ L LE elevation   [x]         Call bell left within reach  [x]         Nursing notified  []         Caregiver present  []         Bed alarm activated  []         SCDs applied    COMMUNICATION/EDUCATION:   [x]         Role of Physical Therapy in the acute care setting. [x]         Fall prevention education was provided and the patient/caregiver indicated understanding. [x]         Patient/family have participated as able in goal setting and plan of care. [x]         Patient/family agree to work toward stated goals and plan of care. [x]         Patient understands intent and goals of therapy, but is neutral about his/her participation. []         Patient is unable to participate in goal setting/plan of care: ongoing with therapy staff.  []         Other:     Thank you for this referral.  Rowena Duke   Time Calculation: 35 mins

## 2020-04-26 NOTE — PROGRESS NOTES
Problem: Falls - Risk of  Goal: *Absence of Falls  Description: Document Colleen Jorge Fall Risk and appropriate interventions in the flowsheet. Outcome: Progressing Towards Goal  Note: Fall Risk Interventions:  Mobility Interventions: PT Consult for mobility concerns, Patient to call before getting OOB         Medication Interventions: Patient to call before getting OOB, Teach patient to arise slowly, Evaluate medications/consider consulting pharmacy    Elimination Interventions: Call light in reach, Toileting schedule/hourly rounds    History of Falls Interventions: Investigate reason for fall         Problem: Patient Education: Go to Patient Education Activity  Goal: Patient/Family Education  Outcome: Progressing Towards Goal     Problem: Pressure Injury - Risk of  Goal: *Prevention of pressure injury  Description: Document Darryn Scale and appropriate interventions in the flowsheet. Outcome: Progressing Towards Goal  Note: Pressure Injury Interventions: Activity Interventions: Increase time out of bed, Pressure redistribution bed/mattress(bed type), PT/OT evaluation    Mobility Interventions: HOB 30 degrees or less, Pressure redistribution bed/mattress (bed type), PT/OT evaluation    Nutrition Interventions: Offer support with meals,snacks and hydration, Document food/fluid/supplement intake    Friction and Shear Interventions: Lift sheet, Lift team/patient mobility team, Minimize layers                Problem: Pressure Injury - Risk of  Goal: *Prevention of pressure injury  Description: Document Darryn Scale and appropriate interventions in the flowsheet. Outcome: Progressing Towards Goal  Note: Pressure Injury Interventions:             Activity Interventions: Increase time out of bed, Pressure redistribution bed/mattress(bed type), PT/OT evaluation    Mobility Interventions: HOB 30 degrees or less, Pressure redistribution bed/mattress (bed type), PT/OT evaluation    Nutrition Interventions: Offer support with meals,snacks and hydration, Document food/fluid/supplement intake    Friction and Shear Interventions: Lift sheet, Lift team/patient mobility team, Minimize layers                Problem: Patient Education: Go to Patient Education Activity  Goal: Patient/Family Education  Outcome: Progressing Towards Goal     Problem: Discharge Planning  Goal: *Discharge to safe environment  Outcome: Progressing Towards Goal     Problem: Gas Exchange - Impaired  Goal: *Absence of hypoxia  Outcome: Progressing Towards Goal     Problem: Pain  Goal: *Control of Pain  Outcome: Progressing Towards Goal  Goal: *PALLIATIVE CARE:  Alleviation of Pain  Outcome: Progressing Towards Goal     Problem: Patient Education: Go to Patient Education Activity  Goal: Patient/Family Education  Outcome: Progressing Towards Goal

## 2020-04-27 LAB
ATRIAL RATE: 84 BPM
CALCULATED P AXIS, ECG09: 89 DEGREES
CALCULATED R AXIS, ECG10: -2 DEGREES
CALCULATED T AXIS, ECG11: 46 DEGREES
DIAGNOSIS, 93000: NORMAL
P-R INTERVAL, ECG05: 206 MS
Q-T INTERVAL, ECG07: 370 MS
QRS DURATION, ECG06: 90 MS
QTC CALCULATION (BEZET), ECG08: 437 MS
VENTRICULAR RATE, ECG03: 84 BPM

## 2020-04-27 PROCEDURE — 97166 OT EVAL MOD COMPLEX 45 MIN: CPT

## 2020-04-27 PROCEDURE — 74011250637 HC RX REV CODE- 250/637: Performed by: HOSPITALIST

## 2020-04-27 PROCEDURE — 94640 AIRWAY INHALATION TREATMENT: CPT

## 2020-04-27 PROCEDURE — 94761 N-INVAS EAR/PLS OXIMETRY MLT: CPT

## 2020-04-27 PROCEDURE — 97535 SELF CARE MNGMENT TRAINING: CPT

## 2020-04-27 PROCEDURE — 74011250636 HC RX REV CODE- 250/636: Performed by: ORTHOPAEDIC SURGERY

## 2020-04-27 PROCEDURE — 97530 THERAPEUTIC ACTIVITIES: CPT

## 2020-04-27 PROCEDURE — 74011250637 HC RX REV CODE- 250/637: Performed by: PHYSICIAN ASSISTANT

## 2020-04-27 PROCEDURE — 65270000029 HC RM PRIVATE

## 2020-04-27 PROCEDURE — 74011250637 HC RX REV CODE- 250/637: Performed by: ORTHOPAEDIC SURGERY

## 2020-04-27 PROCEDURE — 74011250636 HC RX REV CODE- 250/636: Performed by: HOSPITALIST

## 2020-04-27 PROCEDURE — 74011000250 HC RX REV CODE- 250: Performed by: HOSPITALIST

## 2020-04-27 PROCEDURE — 97112 NEUROMUSCULAR REEDUCATION: CPT

## 2020-04-27 PROCEDURE — 77010033678 HC OXYGEN DAILY

## 2020-04-27 RX ADMIN — CELECOXIB 200 MG: 100 CAPSULE ORAL at 09:40

## 2020-04-27 RX ADMIN — FLUOXETINE 10 MG: 10 CAPSULE ORAL at 09:40

## 2020-04-27 RX ADMIN — DEXTROSE MONOHYDRATE, SODIUM CHLORIDE, AND POTASSIUM CHLORIDE 100 ML/HR: 50; 4.5; 1.49 INJECTION, SOLUTION INTRAVENOUS at 00:47

## 2020-04-27 RX ADMIN — MORPHINE SULFATE 2 MG: 2 INJECTION, SOLUTION INTRAMUSCULAR; INTRAVENOUS at 10:47

## 2020-04-27 RX ADMIN — TRAMADOL HYDROCHLORIDE 50 MG: 50 TABLET, FILM COATED ORAL at 15:18

## 2020-04-27 RX ADMIN — BUDESONIDE 500 MCG: 0.5 INHALANT RESPIRATORY (INHALATION) at 20:18

## 2020-04-27 RX ADMIN — ALLOPURINOL 300 MG: 100 TABLET ORAL at 09:40

## 2020-04-27 RX ADMIN — Medication 10 ML: at 15:19

## 2020-04-27 RX ADMIN — GABAPENTIN 300 MG: 100 CAPSULE ORAL at 17:58

## 2020-04-27 RX ADMIN — TRAMADOL HYDROCHLORIDE 50 MG: 50 TABLET, FILM COATED ORAL at 05:37

## 2020-04-27 RX ADMIN — ARFORMOTEROL TARTRATE 15 MCG: 15 SOLUTION RESPIRATORY (INHALATION) at 07:07

## 2020-04-27 RX ADMIN — BUDESONIDE 500 MCG: 0.5 INHALANT RESPIRATORY (INHALATION) at 07:08

## 2020-04-27 RX ADMIN — TRAMADOL HYDROCHLORIDE 50 MG: 50 TABLET, FILM COATED ORAL at 21:08

## 2020-04-27 RX ADMIN — DEXTROSE MONOHYDRATE, SODIUM CHLORIDE, AND POTASSIUM CHLORIDE 100 ML/HR: 50; 4.5; 1.49 INJECTION, SOLUTION INTRAVENOUS at 20:12

## 2020-04-27 RX ADMIN — GABAPENTIN 300 MG: 100 CAPSULE ORAL at 09:40

## 2020-04-27 RX ADMIN — SENNOSIDES AND DOCUSATE SODIUM 2 TABLET: 8.6; 5 TABLET ORAL at 09:40

## 2020-04-27 RX ADMIN — ARFORMOTEROL TARTRATE 15 MCG: 15 SOLUTION RESPIRATORY (INHALATION) at 20:18

## 2020-04-27 RX ADMIN — DEXTROSE MONOHYDRATE, SODIUM CHLORIDE, AND POTASSIUM CHLORIDE 100 ML/HR: 50; 4.5; 1.49 INJECTION, SOLUTION INTRAVENOUS at 10:53

## 2020-04-27 RX ADMIN — ENOXAPARIN SODIUM 40 MG: 40 INJECTION SUBCUTANEOUS at 09:39

## 2020-04-27 RX ADMIN — Medication 10 ML: at 05:36

## 2020-04-27 RX ADMIN — CELECOXIB 200 MG: 100 CAPSULE ORAL at 17:57

## 2020-04-27 NOTE — PROGRESS NOTES
Patient received in bed awake. Patient A&O x4, denies pain and discomfort. No distress noted. Frequently use items within reach. Bed locked in low position. Call bell within reach and Patient verbalized understanding of use for assistance and needs. 1047- Patient given Morphine 2 mg IV for left leg pain 8/10. See MAR and pain assessments. 1100- Patient awake. Ace wrapping place to left ankle assisted by Madai Higgins RN. Call bell w/in reach. 1510- Pt made aware of order to DC rico catheter, voiced understanding. Rico dc 'd emptied 1400 ml of clear yellow urine; no sediments noted. Pt tolerated well. Made aware of need of urinating w/in 6 hrs, voiced understanding. 1518- Patient given scheduled Ultram 50 mg PO for left leg pain 7/10. See MAR and pain assessments.

## 2020-04-27 NOTE — PROGRESS NOTES
Problem: Falls - Risk of  Goal: *Absence of Falls  Description: Document Clydene Bone Fall Risk and appropriate interventions in the flowsheet. Outcome: Progressing Towards Goal  Note: Fall Risk Interventions:  Mobility Interventions: PT Consult for mobility concerns         Medication Interventions: Patient to call before getting OOB, Teach patient to arise slowly    Elimination Interventions: Toileting schedule/hourly rounds, Call light in reach    History of Falls Interventions: Door open when patient unattended, Room close to nurse's station         Problem: Patient Education: Go to Patient Education Activity  Goal: Patient/Family Education  Outcome: Progressing Towards Goal     Problem: Pressure Injury - Risk of  Goal: *Prevention of pressure injury  Description: Document Darryn Scale and appropriate interventions in the flowsheet. Outcome: Progressing Towards Goal  Note: Pressure Injury Interventions:             Activity Interventions: Increase time out of bed    Mobility Interventions: HOB 30 degrees or less, Pressure redistribution bed/mattress (bed type)    Nutrition Interventions: Offer support with meals,snacks and hydration    Friction and Shear Interventions: Minimize layers                Problem: Patient Education: Go to Patient Education Activity  Goal: Patient/Family Education  Outcome: Progressing Towards Goal     Problem: Discharge Planning  Goal: *Discharge to safe environment  Outcome: Progressing Towards Goal     Problem: Gas Exchange - Impaired  Goal: *Absence of hypoxia  Outcome: Progressing Towards Goal     Problem: Pain  Goal: *Control of Pain  Outcome: Progressing Towards Goal     Problem: Patient Education: Go to Patient Education Activity  Goal: Patient/Family Education  Outcome: Progressing Towards Goal     Problem: Patient Education: Go to Patient Education Activity  Goal: Patient/Family Education  Outcome: Progressing Towards Goal

## 2020-04-27 NOTE — PROGRESS NOTES
Problem: Falls - Risk of  Goal: *Absence of Falls  Description: Document Poli Escalante Fall Risk and appropriate interventions in the flowsheet.   Outcome: Progressing Towards Goal  Note: Fall Risk Interventions:  Mobility Interventions: Patient to call before getting OOB         Medication Interventions: Evaluate medications/consider consulting pharmacy, Patient to call before getting OOB    Elimination Interventions: Call light in reach, Patient to call for help with toileting needs    History of Falls Interventions: Door open when patient unattended, Evaluate medications/consider consulting pharmacy         Problem: Patient Education: Go to Patient Education Activity  Goal: Patient/Family Education  Outcome: Progressing Towards Goal     Problem: Pain  Goal: *Control of Pain  Outcome: Progressing Towards Goal     Problem: Patient Education: Go to Patient Education Activity  Goal: Patient/Family Education  Outcome: Progressing Towards Goal

## 2020-04-27 NOTE — PROGRESS NOTES
Problem: Self Care Deficits Care Plan (Adult)  Goal: *Acute Goals and Plan of Care (Insert Text)  Description: Occupational Therapy Goals  Initiated 4/27/2020 within 7 day(s). 1.  Patient will perform grooming with modified independence. 2.  Patient will perform bathing with modified independence. 3.  Patient will perform upper body dressing and lower body dressing with modified independence using adaptive equipment as needed. 4.  Patient will perform bedside commode transfers with modified independence using RW. 5.  Patient will perform all aspects of toileting with modified independence. 6.  Patient will participate in upper extremity therapeutic exercise/activities with modified independence for 10 minutes. 7.  Patient will utilize energy conservation techniques during functional activities with min verbal cues. Prior Level of Function: (I) w/ ADLs and Mod I functional mobility using SPC   Outcome: Progressing Towards Goal   OCCUPATIONAL THERAPY EVALUATION    Patient: Izzy Nelson (9 y.o. female)  Date: 4/27/2020  Primary Diagnosis: Hip fracture ([facility] [address] [S72.009A]  Procedure(s) (LRB):  OPERATIVE REPAIR OF LEFT HIP FRACTURE; CLOSED TREATMENT OF LEFT ANKLE FRACTURE (Left) 2 Days Post-Op   Precautions:   Fall, WBAT(LLE)  PLOF: see above    ASSESSMENT :  Nursing/Trupti cleared for pt to participate in OT evaluation and tx session. Based on the objective data described below, the patient presents with decreased strength BUE MMT 4/5, BUE AROM WFL, fair functional activity tolerance , Balance: sitting static good, sitting dynamic fair, standing static fair-, standing dynamic fair-, poor safety awareness and noted BUE tremors (pt reports increase with anxiousness), which is inhibiting ability to perform ADLs and functional transfers independently.  Based upon clinical judgement, patient requires assist with functional mobility e.g. Mod A x 2 with bed mobility for supine <-> sit & LLE mgmt, bedside commode transfer: Min A x 2 using RW with verbal cues for purselip breathing and pacing self from bed-> bedside commode, mod A x 2 using RW with verbal cues for purselip breathing and pacing self from bedside commode-> bed with noted increased fear requiring max vc's for feet placement and use of RW for stepping back to edge of bed to prevent fall-pt reports she could not feel her weight through LLE/ankle and that it scared her, UB andLB bathing with mod A, UB dress with Min A, LB dress with Mod A, toileting with mod A, grooming with set up and self feeding with mod I. Patient educated on role of OT and POC; patient verbalized understanding. Pt. Instructed on safety awareness with importance to utilize call bell to request assist with functional transfers to prevent falls, patient verbalized understanding and provided accurate demonstration. Dry erase communication board updated for accuracy w/ carryover for bedside commode transfers using RW and 2 person assist.    Patient will benefit from skilled intervention to address the above impairments.   Patient's rehabilitation potential is considered to be Excellent  Factors which may influence rehabilitation potential include:   []             None noted  []             Mental ability/status  []             Medical condition  [x]             Home/family situation and support systems  []             Safety awareness  []             Pain tolerance/management  []             Other:      PLAN :  Recommendations and Planned Interventions:   [x]               Self Care Training                  [x]      Therapeutic Activities  [x]               Functional Mobility Training   []      Cognitive Retraining  [x]               Therapeutic Exercises           [x]      Endurance Activities  [x]               Balance Training                    [x]      Neuromuscular Re-Education  []               Visual/Perceptual Training     [x]      Home Safety Training  [x]               Patient Education                   [x]      Family Training/Education  []               Other (comment):    Frequency/Duration: Patient will be followed by occupational therapy 1-2x/day, 3-5 times a week to address goals. Discharge Recommendations: [facility]  Further Equipment Recommendations for Discharge: bedside commode, rolling walker, and wheelchair      SUBJECTIVE:   Patient stated I have a shower seat.     OBJECTIVE DATA SUMMARY:     Past Medical History:   Diagnosis Date    Anxiety     Arthritis     Cancer ([facility] [address] 1979    uterine    Chronic bronchitis (HCC)     Chronic obstructive pulmonary disease (HCC)     Depression     GERD (gastroesophageal reflux disease)     Gout     Hyperlipemia     Menopause     Vitamin D deficiency      Past Surgical History:   Procedure Laterality Date    HX HEENT      mass removed from neck    HX HYSTERECTOMY       Barriers to Learning/Limitations: None  Compensate with: visual, verbal, tactile, kinesthetic cues/model    Home Situation:   Home Situation  Home Environment: Private residence  # Steps to Enter: 2  Rails to Enter: Yes  Hand Rails : Bilateral  One/Two Story Residence: Two story, live on 1st floor  Living Alone: Yes  Support Systems: Friends \ neighbors  Patient Expects to be Discharged to[de-identified] Private residence  Current DME Used/Available at Home: [employer] [place], rollator, Grab bars, Shower chair  Tub or Shower Type: Tub/Shower combination  [x]  Right hand dominant   []  Left hand dominant    Cognitive/Behavioral Status:  Neurologic State: Alert  Orientation Level: Oriented X4  Cognition: Follows commands  Safety/Judgement: Fall prevention    Skin:  R hip surgical incision   Edema: hip hip s/p sx    Vision/Perceptual:  appears intact    Coordination: BUE  Coordination: Within functional limits(BUEs)  Fine Motor Skills-Upper: Left Intact; Right Intact    Gross Motor Skills-Upper: Left Intact; Right Intact    Balance:  Sitting: Impaired  Sitting - Static: Good (unsupported)  Sitting - Dynamic: Fair (occasional)  Standing: Impaired  Standing - Static: Fair(-)  Standing - Dynamic : Fair(-)    Strength: BUE  Strength: Generally decreased, functional(BUEs 4/5)     Tone & Sensation: BUE  Tone: Normal(BUEs)  Sensation: Intact(BUEs)    Range of Motion: BUE  AROM: Within functional limits(BUEs)    Functional Mobility and Transfers for ADLs:  Bed Mobility:     Supine to Sit: Moderate assistance;Assist x2  Sit to Supine: Moderate assistance;Assist x2  Scooting: Contact guard assistance  Transfers:  Sit to Stand: Minimum assistance;Assist x2  Stand to Sit: Minimum assistance;Assist x2     Toilet Transfer : Minimum assistance; Moderate assistance;Assist x2(bedside commode)     ADL Assessment:   Feeding: Modified independent    Oral Facial Hygiene/Grooming: Setup    Bathing: Moderate assistance    Upper Body Dressing: Minimum assistance    Lower Body Dressing: Moderate assistance    Toileting: Moderate assistance                ADL Intervention:                                     Cognitive Retraining  Safety/Judgement: Fall prevention    Pain:  Pain level pre-treatment: 5/10   Pain level post-treatment: 6/10   Pain Intervention(s): Medication (see MAR); Rest, Ice, Repositioning   Response to intervention: Nurse notified, See doc flow    Activity Tolerance:   fair  Please refer to the flowsheet for vital signs taken during this treatment. After treatment:   [] Patient left in no apparent distress sitting up in chair  [x] Patient left in no apparent distress in bed  [x] Call bell left within reach  [x] Nursing notified  [] Caregiver present  [] Bed alarm activated    COMMUNICATION/EDUCATION:   [x] Role of Occupational Therapy in the acute care setting  [x] Home safety education was provided and the patient/caregiver indicated understanding. [x] Patient/family have participated as able in goal setting and plan of care.   [x] Patient/family agree to work toward stated goals and plan of care.  [] Patient understands intent and goals of therapy, but is neutral about his/her participation. [] Patient is unable to participate in goal setting and plan of care. Thank you for this referral.  Karenomayra Plascencia  Time Calculation: 30 mins    Eval Complexity: History: MEDIUM Complexity : Expanded review of history including physical, cognitive and psychosocial  history ; Examination: MEDIUM Complexity : 3-5 performance deficits relating to physical, cognitive , or psychosocial skils that result in activity limitations and / or participation restrictions; Decision Making:MEDIUM Complexity : Patient may present with comorbidities that affect occupational performnce.  Miniml to moderate modification of tasks or assistance (eg, physical or verbal ) with assesment(s) is necessary to enable patient to complete evaluation

## 2020-04-27 NOTE — PROGRESS NOTES
Problem: Mobility Impaired (Adult and Pediatric)  Goal: *Acute Goals and Plan of Care (Insert Text)  Description: Physical Therapy Goals  Initiated 4/26/2020 and to be accomplished within 7 day(s)  1. Patient will move from supine to sit and sit to supine in bed with minimal assistance/contact guard assist in order to facilitate eating meals in sitting. 2.  Patient will transfer from bed to chair and chair to bed with minimal assistance/contact guard assist using the least restrictive device in order to facilitate participation in sitting ADLs. 3.  Patient will perform sit to stand with minimal assistance/contact guard assist in order to prepare for standing ADLs. 4.  Patient will ambulate with minimal assistance/contact guard assist for 50 feet with the least restrictive device in order to facilitate improved ease with ambulation to the restroom. 5.  Patient will ascend/descend 2 stairs with handrail(s) with moderate assistance  in order to prepare patient to safely enter residence. Outcome: Progressing Towards Goal   PHYSICAL THERAPY TREATMENT    Patient: Darío Davison (89 y.o. female)  Date: 4/27/2020  Diagnosis: Hip fracture ([facility] [address] [S72.009A]   Hip fracture (HCC)  Procedure(s) (LRB):  OPERATIVE REPAIR OF LEFT HIP FRACTURE; CLOSED TREATMENT OF LEFT ANKLE FRACTURE (Left) 2 Days Post-Op  Precautions: Fall, WBAT(L LE)  WBAT LLE  PLOF: Mod I  ASSESSMENT:  Agreeable to PT. Mod A for supine to sit; assist for LLE and trunk. Seated EOB with min A/supervision for balance. Ortho THALIA Mullins delivered left ankle brace to be worn with amb. Max A for sit to stand from EOB. Required 3 attempts to achieve standing. Poor standing balance at ww. Unable to shift weight for side steps/transfer  to bedside commode. Seated on bedpan at EOB. Unable to perform sit to stand second time to remove bedpan; weight shifting in seated to remove bedpan. Able to scoot self up in bed with min A and additional time.   Min A for sit to supine. Seated in bed with HOB elevated. Educated on need for RN assistance with mobility; verbalized understanding. Call bell in reach. Progression toward goals:   []      Improving appropriately and progressing toward goals  [x]      Improving slowly and progressing toward goals  []      Not making progress toward goals and plan of care will be adjusted     PLAN:  Patient continues to benefit from skilled intervention to address the above impairments. Continue treatment per established plan of care. Discharge Recommendations:  Skilled Nursing Facility  Further Equipment Recommendations for Discharge:  TBD next level of care     SUBJECTIVE:   Patient stated I'm sorry. I couldn't do better.     OBJECTIVE DATA SUMMARY:   Critical Behavior:  Neurologic State: Alert  Orientation Level: Oriented to person  Cognition: Follows commands     Psychosocial  Patient Behaviors: Cooperative  Functional Mobility:  Bed Mobility:  Supine to Sit: Moderate assistance  Sit to Supine: Minimum assistance  Scooting: Minimum assistance  Transfers:  Sit to Stand: Maximum assistance  Stand to Sit: Minimum assistance  Balance:   Sitting: Impaired  Sitting - Static: Good (unsupported)  Sitting - Dynamic: Fair (occasional)  Standing: Impaired  Standing - Static: Poor  Neuro Re-Education:  Seated EOB 20 minutes  Therapeutic Exercises:   Sit to stand    Pain:  Pain level pre-treatment: 8/10  Pain level post-treatment: 8/10     Activity Tolerance:   Fair    After treatment:   [] Patient left in no apparent distress sitting up in chair  [x] Patient left in no apparent distress in bed  [x] Call bell left within reach  [x] Nursing notified  [] Caregiver present  [] Bed alarm activated  [] SCDs applied      COMMUNICATION/EDUCATION:   [x]         Role of physical therapy in the acute care setting. [x]         Fall prevention education was provided and the patient/caregiver indicated understanding.   [x]         Patient/family have participated as able in working toward goals and plan of care. [x]         Patient/family agree to work toward stated goals and plan of care. []         Patient understands intent and goals of therapy, but is neutral about his/her participation. []         Patient is unable to participate in stated goals/plan of care: ongoing with therapy staff.       Diane Esteves, PT   Time Calculation: 43 mins

## 2020-04-27 NOTE — PROGRESS NOTES
Progress Note      Patient: Jerilyn Vaughn               Sex: female          DOA: 4/24/2020       YOB: 1945      Age:  69 y.o.        LOS:  LOS: 3 days             CHIEF COMPLAINT:  Hip fracture    Subjective:     Patient is awake and talking  No distress    Objective:      Visit Vitals  /62 (BP 1 Location: Left arm, BP Patient Position: At rest)   Pulse 71   Temp 97.7 °F (36.5 °C)   Resp 18   Ht 5' 6.5\" (1.689 m)   Wt 86.2 kg (190 lb)   SpO2 100%   BMI 30.21 kg/m²       Physical Exam:  Gen:  No distress, no complaint  Lungs:  Clear bilaterally, no wheeze or rhonchi  Heart:  Regular rate and rhythm, no murmurs or gallops  Abdomen:  Soft, non-tender, normal bowel sounds        Lab/Data Reviewed:    Labs reviewed        Assessment/Plan     Principal Problem:    Hip fracture ([facility] [address] (4/24/2020)    Active Problems:    COPD (chronic obstructive pulmonary disease) ([facility] [address] (4/24/2020)        Plan: Mobilization with PT  Pain control  Anticipate discharge to SNF tomorrow.

## 2020-04-27 NOTE — PROGRESS NOTES
NUTRITION INITIAL ASSESSMENT/PLAN OF CARE      RECOMMENDATIONS:   1. Cardiac Diet   2. Monitor labs, weight and PO intake  3. RD to follow     GOALS:   1. PO intake meets >75% of protein/calorie needs by 5/4      ASSESSMENT:   Wt status is classified as obese per Body mass index is 30.21 kg/m². Adequate PO intake. Labs noted. Nutrition recommendations listed. RD to follow. Nutrition Diagnoses:   Obesity related to predicted excessive energy intake as evidenced by BMI of 30.2 and 146% IBW. SUBJECTIVE/OBJECTIVE:   Pt admitted for hip fracture. Pt is s/p Operative fixation of left hip fracture with short cephalomedullary nail; Evaluation of the left ankle under anesthesia on (4/25). Pt seen in room this afternoon at lunch; observed 100% of meal consumed. Reports appetite/PO intake is adequate and normally consumes 2 meals per day at home. Denies any GI distress and last BM on (4/26) per I/Os. NKFA or problems chewing/swallowing. Encouraged to implement preferences with dietary. Will monitor. Information Obtained from:    [x] Chart Review   [x] Patient   [] Family/Caregiver   [] Nurse/Physician   [] Interdisciplinary Meeting/Rounds      Diet: Cardiac Diet  Medications: [x] Reviewed   IVF: D5 1/2 NS w/ KCl 20 mEq/L @100 mL/hr (408 kcal/day)  Zyloprim, Neurontin, Miralax, Pericolace,   Allergies: [x] Reviewed   Encounter Diagnoses     ICD-10-CM ICD-9-CM   1. Closed nondisplaced intertrochanteric fracture of left femur, initial encounter ([facility] S72.145A 820.21   2.  Closed fracture of distal end of left fibula, unspecified fracture morphology, initial encounter S82.832A 824.8     Past Medical History:   Diagnosis Date    Anxiety     Arthritis     Cancer ([facility] [address] 1979    uterine    Chronic bronchitis (HCC)     Chronic obstructive pulmonary disease (HCC)     Depression     GERD (gastroesophageal reflux disease)     Gout     Hyperlipemia     Menopause     Vitamin D deficiency       Labs:    Lab Results   Component Value Date/Time    Sodium 137 04/26/2020 03:45 AM    Potassium 5.0 04/26/2020 03:45 AM    Chloride 107 04/26/2020 03:45 AM    CO2 25 04/26/2020 03:45 AM    Anion gap 5 04/26/2020 03:45 AM    Glucose 149 (H) 04/26/2020 03:45 AM    BUN 10 04/26/2020 03:45 AM    Creatinine 0.71 04/26/2020 03:45 AM    Calcium 7.7 (L) 04/26/2020 03:45 AM    Albumin 2.9 (L) 04/24/2020 02:10 PM     No results found for: GLU, GLUPOC, GLUCPOC  No results found for: CHOL, CHOLPOCT, CHOLX, CHLST, CHOLV, HDL, HDLPOC, HDLP, LDL, LDLCPOC, LDLC, DLDLP, VLDLC, VLDL, TGLX, TRIGL, TRIGP, TGLPOCT, CHHD, CHHDX  Anthropometrics: BMI (calculated): 30.2  Last 3 Recorded Weights in this Encounter    04/24/20 1037   Weight: 86.2 kg (190 lb)      Ht Readings from Last 1 Encounters:   04/24/20 5' 6.5\" (1.689 m)       Documented Weight History:  Weight Metrics 4/24/2020 11/19/2019 2/29/2016 1/15/2015 1/12/2015 10/3/2014 4/17/2014   Weight 190 lb 246 lb 14.6 oz 171 lb 3.2 oz - 164 lb 166 lb 7 oz 177 lb   BMI 30.21 kg/m2 39.85 kg/m2 27.22 kg/m2 26.48 kg/m2 - 26.88 kg/m2 28.14 kg/m2     Per Care Everywhere:       Patient Vitals for the past 100 hrs:   % Diet Eaten   04/26/20 1838 100 %   04/26/20 1300 95 %   04/26/20 0858 100 %   04/25/20 1829 70 %         IBW: 130 lb %IBW: 146% UBW: 185-190 lb   [] Weight Loss [] Weight Gain [x] Weight Stable    Estimated Nutrition Needs: [x] MSJ x 1.2-1.3 [] Other:  Calories: 1701-0432 kcal Based on:   [x] Actual BW    Protein:   78-86 g Based on:   [x] Actual BW x 0.9-1.0 gm/kg   Fluid:       1 mL/kcal     [x] No Cultural, Episcopal or ethnic dietary need identified.     [] Cultural, Episcopal and ethnic food preferences identified and addressed     Wt Status:  [] Normal (18.6 - 24.9) [] Underweight (<18.5) [] Overweight (25 - 29.9) [x] Mild Obesity (30 - 34.9)  [] Moderate Obesity (35 - 39.9) [] Morbid Obesity (40+)       Nutrition Problems Identified:   [] Suboptimal PO intake   [] Food Allergies  [] Difficulty chewing/swallowing/poor dentition  [] Constipation/Diarrhea   [] Nausea/Vomiting   [x] None  [] Other:     Plan:   [x] Therapeutic Diet  []  Obtained/adjusted food preferences/tolerances and/or snacks options   []  Supplements added   [] Occupational therapy following for feeding techniques  []  HS snack added   []  Modify diet texture   []  Modify diet for food allergies   []  Educate patient   []  Assist with menu selection   [x]  Monitor PO intake on meal rounds   [x]  Continue inpatient monitoring and intervention   [x]  Participated in discharge planning/Interdisciplinary rounds/Team meetings   []  Other:     Education Needs:   [] Not appropriate for teaching at this time due to:   [x] Identified and addressed    Nutrition Monitoring and Evaluation:  [x] Continue ongoing monitoring and intervention  [] Other    Elysia Look

## 2020-04-27 NOTE — PROGRESS NOTES
Had been matched out, [facility], spoke with Enid Benton about bed availability. Will call back and let me know. Spoke with Enid Benton at [facility] can accept tomorrow. Spoke with patient she is in agreement with Facility. She stated that it will be only be short term rehab and that her sister that lives across the street can assist after d/c from rehab. Deanna/ [facility] to call patient in room to speak with her as well.

## 2020-04-27 NOTE — PROGRESS NOTES
Progress Note      Post Operative Day: 2    Assessment:    1. Status post left cephalomedullary nail, Progressing. 2. Left ankle sprain equivalent       PLAN:    1. Mobilize. Continue P.T.  2. WBAT LLE including ankle; ace wrap when in bed, elevate, and ice (15-20 mins per hour while awake). Use ankle brace (placed in room this morning) for any distance more than to chair or bathroom  3. Lovenox for DVT prophylaxis  4. Discharge Planning. .  5. OOB  6. D/C Barry         HPI: Jalil Saleem is a 69 y.o. female patient without new orthopaedic changes. Blood pressure 119/57, pulse 87, temperature 98.8 °F (37.1 °C), resp. rate 20, height 5' 6.5\" (1.689 m), weight 86.2 kg (190 lb), SpO2 99 %. CBC w/Diff   Lab Results   Component Value Date/Time    WBC 9.2 04/26/2020 03:45 AM    RBC 2.72 (L) 04/26/2020 03:45 AM    HGB 8.9 (L) 04/26/2020 03:45 AM    HCT 28.1 (L) 04/26/2020 03:45 AM    .3 (H) 04/26/2020 03:45 AM    MCH 32.7 04/26/2020 03:45 AM    MCHC 31.7 04/26/2020 03:45 AM    RDW 14.2 04/26/2020 03:45 AM    Lab Results   Component Value Date/Time    MONOS 7 04/25/2020 03:40 AM    EOS 1 04/25/2020 03:40 AM    BASOS 0 04/25/2020 03:40 AM    RDW 14.2 04/26/2020 03:45 AM             Physical Assessment:  General: in no apparent distress, well developed and well nourished, non-toxic, in no respiratory distress and acyanotic, alert and oriented times 3   Wound: clean, no drainage   Extremities:  Neurovascular intact LLE. Compartments soft and NT distal to surgical site. Able to contract quadriceps. Plantar and dorsiflexion intact; foot edema noted. Palpable DP/PT pulses noted. Denies paresthesias    Dressing:  CDI   DVT Exam:   No exam evidence to suggest DVT. Compartments soft and NT.           Radiology: no new ortho studies          - Keyur Earl, [facility] CARLINE UMAÑA  4/27/2020    Office [phone]

## 2020-04-27 NOTE — PROGRESS NOTES
4/27/2020 FYI placed to ask MD for early DC summery for tomorrow if in agreement that pt is ready to go. She will need Orthropedic's OK also. I Placed pt on [facility] \"will call\" for tomorrow as per CM Pete Birmingham request. PCS placed on envelope with Demographic sheet in chart after faxing it to [facility]. Copy to  office for [facility]. [place].  Ave Youssef, JEAN PIERREN  [facility]  [phone], Pager [phone]  [email or web address]

## 2020-04-28 VITALS
TEMPERATURE: 97.9 F | OXYGEN SATURATION: 98 % | RESPIRATION RATE: 16 BRPM | DIASTOLIC BLOOD PRESSURE: 67 MMHG | WEIGHT: 190 LBS | HEIGHT: 67 IN | HEART RATE: 82 BPM | BODY MASS INDEX: 29.82 KG/M2 | SYSTOLIC BLOOD PRESSURE: 126 MMHG

## 2020-04-28 PROCEDURE — 74011250636 HC RX REV CODE- 250/636: Performed by: ORTHOPAEDIC SURGERY

## 2020-04-28 PROCEDURE — 94761 N-INVAS EAR/PLS OXIMETRY MLT: CPT

## 2020-04-28 PROCEDURE — 74011000250 HC RX REV CODE- 250: Performed by: HOSPITALIST

## 2020-04-28 PROCEDURE — 74011250637 HC RX REV CODE- 250/637: Performed by: HOSPITALIST

## 2020-04-28 PROCEDURE — 74011250637 HC RX REV CODE- 250/637: Performed by: PHYSICIAN ASSISTANT

## 2020-04-28 PROCEDURE — 74011250636 HC RX REV CODE- 250/636: Performed by: HOSPITALIST

## 2020-04-28 PROCEDURE — 97535 SELF CARE MNGMENT TRAINING: CPT

## 2020-04-28 PROCEDURE — 77030038269 HC DRN EXT URIN PURWCK BARD -A

## 2020-04-28 PROCEDURE — 74011250637 HC RX REV CODE- 250/637: Performed by: ORTHOPAEDIC SURGERY

## 2020-04-28 PROCEDURE — 77010033678 HC OXYGEN DAILY

## 2020-04-28 PROCEDURE — 94640 AIRWAY INHALATION TREATMENT: CPT

## 2020-04-28 RX ORDER — ENOXAPARIN SODIUM 100 MG/ML
40 INJECTION SUBCUTANEOUS DAILY
Qty: 12 ML | Refills: 0 | Status: SHIPPED | OUTPATIENT
Start: 2020-04-28 | End: 2020-05-28

## 2020-04-28 RX ORDER — GABAPENTIN 300 MG/1
300 CAPSULE ORAL 2 TIMES DAILY
Qty: 60 CAP | Refills: 0 | Status: SHIPPED | OUTPATIENT
Start: 2020-04-28

## 2020-04-28 RX ORDER — OXYCODONE AND ACETAMINOPHEN 5; 325 MG/1; MG/1
1 TABLET ORAL
Qty: 12 TAB | Refills: 0 | Status: SHIPPED | OUTPATIENT
Start: 2020-04-28 | End: 2020-05-01

## 2020-04-28 RX ADMIN — Medication 10 ML: at 06:38

## 2020-04-28 RX ADMIN — Medication 10 ML: at 02:33

## 2020-04-28 RX ADMIN — GABAPENTIN 300 MG: 100 CAPSULE ORAL at 09:31

## 2020-04-28 RX ADMIN — ARFORMOTEROL TARTRATE 15 MCG: 15 SOLUTION RESPIRATORY (INHALATION) at 09:13

## 2020-04-28 RX ADMIN — TRAMADOL HYDROCHLORIDE 50 MG: 50 TABLET, FILM COATED ORAL at 06:03

## 2020-04-28 RX ADMIN — FLUOXETINE 10 MG: 10 CAPSULE ORAL at 09:31

## 2020-04-28 RX ADMIN — ACETAMINOPHEN 650 MG: 325 TABLET, FILM COATED ORAL at 04:13

## 2020-04-28 RX ADMIN — ENOXAPARIN SODIUM 40 MG: 40 INJECTION SUBCUTANEOUS at 09:32

## 2020-04-28 RX ADMIN — DEXTROSE MONOHYDRATE, SODIUM CHLORIDE, AND POTASSIUM CHLORIDE 100 ML/HR: 50; 4.5; 1.49 INJECTION, SOLUTION INTRAVENOUS at 05:20

## 2020-04-28 RX ADMIN — BUDESONIDE 500 MCG: 0.5 INHALANT RESPIRATORY (INHALATION) at 09:13

## 2020-04-28 RX ADMIN — CELECOXIB 200 MG: 100 CAPSULE ORAL at 09:31

## 2020-04-28 RX ADMIN — ALLOPURINOL 300 MG: 100 TABLET ORAL at 09:31

## 2020-04-28 NOTE — ROUTINE PROCESS
Bedside and Verbal shift change report given to Carlos Green (oncoming nurse) by Jannet Harrell RN (offgoing nurse). Report included the following information SBAR, Kardex and MAR.  
 
0945 - AM meds administered.   Patient assisted to recliner -

## 2020-04-28 NOTE — PROGRESS NOTES
Problem: Falls - Risk of  Goal: *Absence of Falls  Description: Document Amelia Minium Fall Risk and appropriate interventions in the flowsheet. Outcome: Progressing Towards Goal  Note: Fall Risk Interventions:  Mobility Interventions: Communicate number of staff needed for ambulation/transfer, Patient to call before getting OOB         Medication Interventions: Evaluate medications/consider consulting pharmacy    Elimination Interventions: Call light in reach, Patient to call for help with toileting needs, Stay With Me (per policy)    History of Falls Interventions: Door open when patient unattended, Evaluate medications/consider consulting pharmacy         Problem: Patient Education: Go to Patient Education Activity  Goal: Patient/Family Education  Outcome: Progressing Towards Goal     Problem: Pressure Injury - Risk of  Goal: *Prevention of pressure injury  Description: Document Darryn Scale and appropriate interventions in the flowsheet. Outcome: Progressing Towards Goal  Note: Pressure Injury Interventions: Activity Interventions: Pressure redistribution bed/mattress(bed type)    Mobility Interventions: Pressure redistribution bed/mattress (bed type)    Nutrition Interventions: Document food/fluid/supplement intake    Friction and Shear Interventions: Lift team/patient mobility team, HOB 30 degrees or less                Problem: Pressure Injury - Risk of  Goal: *Prevention of pressure injury  Description: Document Darryn Scale and appropriate interventions in the flowsheet. Outcome: Progressing Towards Goal  Note: Pressure Injury Interventions:             Activity Interventions: Pressure redistribution bed/mattress(bed type)    Mobility Interventions: Pressure redistribution bed/mattress (bed type)    Nutrition Interventions: Document food/fluid/supplement intake    Friction and Shear Interventions: Lift team/patient mobility team, HOB 30 degrees or less                Problem: Falls - Risk of  Goal: *Absence of Falls  Description: Document Nba Saba Fall Risk and appropriate interventions in the flowsheet. Outcome: Progressing Towards Goal  Note: Fall Risk Interventions:  Mobility Interventions: Communicate number of staff needed for ambulation/transfer, Patient to call before getting OOB         Medication Interventions: Evaluate medications/consider consulting pharmacy    Elimination Interventions: Call light in reach, Patient to call for help with toileting needs, Stay With Me (per policy)    History of Falls Interventions: Door open when patient unattended, Evaluate medications/consider consulting pharmacy         Problem: Patient Education: Go to Patient Education Activity  Goal: Patient/Family Education  Outcome: Progressing Towards Goal     Problem: Pressure Injury - Risk of  Goal: *Prevention of pressure injury  Description: Document Darryn Scale and appropriate interventions in the flowsheet. Outcome: Progressing Towards Goal  Note: Pressure Injury Interventions:             Activity Interventions: Pressure redistribution bed/mattress(bed type)    Mobility Interventions: Pressure redistribution bed/mattress (bed type)    Nutrition Interventions: Document food/fluid/supplement intake    Friction and Shear Interventions: Lift team/patient mobility team, HOB 30 degrees or less                Problem: Patient Education: Go to Patient Education Activity  Goal: Patient/Family Education  Outcome: Progressing Towards Goal     Problem: Discharge Planning  Goal: *Discharge to safe environment  Outcome: Progressing Towards Goal     Problem: Gas Exchange - Impaired  Goal: *Absence of hypoxia  Outcome: Progressing Towards Goal     Problem: Pain  Goal: *Control of Pain  Outcome: Progressing Towards Goal     Problem: Patient Education: Go to Patient Education Activity  Goal: Patient/Family Education  Outcome: Progressing Towards Goal     Problem: Chronic Obstructive Pulmonary Disease (COPD)  Goal: *Oxygen saturation during activity within specified parameters  Outcome: Progressing Towards Goal  Goal: *Able to remain out of bed as prescribed  Outcome: Progressing Towards Goal  Goal: *Absence of hypoxia  Outcome: Progressing Towards Goal  Goal: *Optimize nutritional status  Outcome: Progressing Towards Goal     Problem: Patient Education: Go to Patient Education Activity  Goal: Patient/Family Education  Outcome: Progressing Towards Goal     Problem: Hip Fracture: Post-Op Day 3  Goal: Activity/Safety  Outcome: Progressing Towards Goal  Goal: Diagnostic Test/Procedures  Outcome: Progressing Towards Goal  Goal: Nutrition/Diet  Outcome: Progressing Towards Goal  Goal: Medications  Outcome: Progressing Towards Goal  Goal: Respiratory  Outcome: Progressing Towards Goal  Goal: Treatments/Interventions/Procedures  Outcome: Progressing Towards Goal  Goal: Psychosocial  Outcome: Progressing Towards Goal  Goal: *Met physical therapy criteria for discharge to next level of care  Outcome: Progressing Towards Goal  Goal: *Optimal pain control at patient's stated goal  Outcome: Progressing Towards Goal  Goal: *Hemodynamically stable  Outcome: Progressing Towards Goal  Goal: *Tolerating diet  Outcome: Progressing Towards Goal  Goal: *Active bowel function  Outcome: Progressing Towards Goal  Goal: *Adequate urinary output  Outcome: Progressing Towards Goal  Goal: *Absence of skin breakdown  Outcome: Progressing Towards Goal  Goal: *Patient verbalizes understanding of discharge instructions  Outcome: Progressing Towards Goal

## 2020-04-28 NOTE — PROGRESS NOTES
Discharge instructions provided to pt on behalf of primary nurse Christy Garcia. Peripheral iv and SpO2 removed. OT at the bedside to work with pt. Belongings packed to include purse. Pt placed dentures in her mouth. Plan for 1130 medical transport to [facility]..

## 2020-04-28 NOTE — ROUTINE PROCESS
Report called to [address] at [facility], [facility]. -  scheduled for [id number]

## 2020-04-28 NOTE — PROGRESS NOTES
Progress Note      Post Operative Day: 3    Assessment:    1. Status post left cephalomedullary nail, Progressing. 2. Left ankle sprain equivalent       PLAN:    1. Mobilize.  Continue P.T.  2. WBAT LLE including ankle; ace wrap when in bed, elevate, and ice (15-20 mins per hour while awake). Use ankle brace (placed in room this morning) for any distance more than to chair or bathroom  3. Lovenox for DVT prophylaxis  4. Discharge Planning; clear for SNF from ortho standpoint. Follow up with Dr. Bradley Quarles in 1 month. Call [phone] for appointment. Staples out 5-5-20  5. OOB           HPI: Aquiles Bowden is a 69 y.o. female patient without new orthopaedic changes. Blood pressure 123/68, pulse 87, temperature 98 °F (36.7 °C), resp. rate 16, height 5' 6.5\" (1.689 m), weight 86.2 kg (190 lb), SpO2 99 %. CBC w/Diff   Lab Results   Component Value Date/Time    WBC 9.2 04/26/2020 03:45 AM    RBC 2.72 (L) 04/26/2020 03:45 AM    HGB 8.9 (L) 04/26/2020 03:45 AM    HCT 28.1 (L) 04/26/2020 03:45 AM    .3 (H) 04/26/2020 03:45 AM    MCH 32.7 04/26/2020 03:45 AM    MCHC 31.7 04/26/2020 03:45 AM    RDW 14.2 04/26/2020 03:45 AM    Lab Results   Component Value Date/Time    MONOS 7 04/25/2020 03:40 AM    EOS 1 04/25/2020 03:40 AM    BASOS 0 04/25/2020 03:40 AM    RDW 14.2 04/26/2020 03:45 AM             Physical Assessment:  General: in no apparent distress, well developed and well nourished, non-toxic, in no respiratory distress and acyanotic, alert and oriented times 3   Wound: no drainage   Extremities:  Neurovascular intact LLE. Compartments soft and NT distal to surgical site. Able to contract quadriceps. Plantar and dorsiflexion intact; foot edema noted. Palpable DP/PT pulses noted. Denies paresthesias   Dressing:  CDI   DVT Exam:   No exam evidence to suggest DVT. Compartments soft and NT.           Radiology: no new ortho studies          - Gualberto Langley, [facility], PA-C  4/28/2020    Office [phone]

## 2020-04-28 NOTE — ROUTINE PROCESS
Bedside and Verbal shift change report given to Coby RN and Margie RN (oncoming nurses) by Leonardo Georges RN, BSN (offgoing nurse). Report given with SBAR, Kardex, Intake/Output, MAR and Recent Results.

## 2020-04-28 NOTE — PROGRESS NOTES
Problem: Discharge Planning  Goal: *Discharge to safe environment  Outcome: Resolved/Met    SNF/ [address] [facility]): [address] [facility] [address], [address] [address]       Phone: [phone]       Fax: [phone]              Care Management Interventions  PCP Verified by CM: Yes(Dr. Sol Mcmillan, last seen 3-4 months ago)  Palliative Care Criteria Met (RRAT>21 & CHF Dx)?: No  Mode of Transport at Discharge: Other (see comment)(family)  Transition of Care Consult (CM Consult): SNF([facility])  Partner SNF: Yes  Discharge Durable Medical Equipment: No  Physical Therapy Consult: No  Occupational Therapy Consult: No  Speech Therapy Consult: No  Current Support Network: Lives Alone  Confirm Follow Up Transport: Family  The Plan for Transition of Care is Related to the Following Treatment Goals : SNF([facility])  Name of the Patient Representative Who was Provided with a Choice of Provider and Agrees with the Discharge Plan: Celso Foss  Discharge Location  Discharge Placement: Skilled nursing facility([facility])     Transition of Care Plan to SNF/Rehab    Communication to Patient/Family:  Met with patient and family and they are agreeable to the transition plan. The Plan for Transition of Care is related to the following treatment goals: SNF / [facility]    The Patient and/or patient representative was provided with a choice of provider and agrees  with the discharge plan. Yes [x] No []    A Freedom of choice list was provided with basic dialogue that supports the patient's individualized plan of care/goals and shares the quality data associated with the providers. Yes [x] No []    SNF/Rehab Transition:  Patient has been accepted to [facility] SNF/Rehab and meets criteria for admission.    Patient will transported by [facility] and expected to leave at .today    Communication to SNF/Rehab:  Bedside RN,, has been notified to update the transition plan to the facility and call report. Discharge information has been updated on the AVS. And communicated to facility via [employer], or CC link. Discharge instructions to be fax'd to facility at [facility] #). [facility], [facility]. connected in epic    Patient has been identified as part of the  [employer].  For Care Coordination associated with that Bundle Program, please contact n/a  Bundle information has been communication to . Nursing Please include all hard scripts for controlled substances, med rec and dc summary, and AVS in packet. Reviewed and confirmed with facility, [facility] , can manage the patient care needs for the following:     Roxann Lorenzo with (X) only those applicable:  Medication:  []Medications are available at the facility  []IV Antibiotics    []Controlled Substance  hard copies available sent. []Weekly Labs    Equipment:  []CPAP/BiPAP  []Wound Vacuum  []Reddy or Urinary Device  []PICC/Central Line  []Nebulizer  []Ventilator    Treatment:  []Isolation (for MRSA, VRE, etc.)  []Surgical Drain Management  []Tracheostomy Care  []Dressing Changes  []Dialysis with transportation  []PEG Care  []Oxygen  []Daily Weights for Heart Failure    Dietary:  []Any diet limitations  []Tube Feedings   []Total Parenteral Management (TPN)    Financial Resources:  []Medicaid Application Completed    []UAI Completed and copy given to pt/family  and copy given to pt/family  []A screening has previously been completed. []Level II Completed    [] Private pay individual who will not become   financially eligible for Medicaid within 6 months from admission to a [address] [place] [address] facility. [] Individual refused to have screening conducted. []Medicaid Application Completed    []The screening denied because it was determined individual did not need/did not qualify for nursing facility level of care. [] Out of state residents seeking direct admission to a [address] facility.   [] Individuals who are inpatients of an out of state hospital, or in state or out of state veterans/ hospital and seek direct admission to a [address] facility  [] Individuals who are pateints or residents of a state owned/operated facility that is licensed by Department of [employer] ([facility]) and seek direct admission to [address] facility  [] A screening not required for enrollment in [facility] services as set out in [address] [place] [address] 07-  [] [facility] staff shall perform screenings of the [facility] clients. Advanced Care Plan:  []Surrogate Decision Maker of Care  []POA  []Communicated Code Status and copy sent. Other:          Awaiting d/c order and summary. Nursing to call report # above. Patient placed on will call with [facility] to transport. PCS has already been faxed to [address] [place] [address]. Patient plans for short term rehab only. [facility] will  patient at 11:30 am today.

## 2020-04-28 NOTE — DISCHARGE INSTRUCTIONS
DISCHARGE SUMMARY from Nurse    PATIENT INSTRUCTIONS:    After general anesthesia or intravenous sedation, for 24 hours or while taking prescription Narcotics:  · Limit your activities  · Do not drive and operate hazardous machinery  · Do not make important personal or business decisions  · Do  not drink alcoholic beverages  · If you have not urinated within 8 hours after discharge, please contact your surgeon on call. Report the following to your surgeon:  · Excessive pain, swelling, redness or odor of or around the surgical area  · Temperature over 100.5  · Nausea and vomiting lasting longer than 4 hours or if unable to take medications  · Any signs of decreased circulation or nerve impairment to extremity: change in color, persistent  numbness, tingling, coldness or increase pain  · Any questions    What to do at Home:  Recommended activity: Activity as tolerated and PT/OT Eval and Treat    If you experience any of the following symptoms uncontrolled pain, fever, chills, or not healing as expected, please follow up with rehab staff. *  Please give a list of your current medications to your Primary Care Provider. *  Please update this list whenever your medications are discontinued, doses are      changed, or new medications (including over-the-counter products) are added. *  Please carry medication information at all times in case of emergency situations. These are general instructions for a healthy lifestyle:    No smoking/ No tobacco products/ Avoid exposure to second hand smoke  Surgeon General's Warning:  Quitting smoking now greatly reduces serious risk to your health.     Obesity, smoking, and sedentary lifestyle greatly increases your risk for illness    A healthy diet, regular physical exercise & weight monitoring are important for maintaining a healthy lifestyle    You may be retaining fluid if you have a history of heart failure or if you experience any of the following symptoms:  Weight gain of 3 pounds or more overnight or 5 pounds in a week, increased swelling in our hands or feet or shortness of breath while lying flat in bed. Please call your doctor as soon as you notice any of these symptoms; do not wait until your next office visit. The discharge information has been reviewed with the patient. The patient verbalized understanding. Discharge medications reviewed with the patient and appropriate educational materials and side effects teaching were provided. Patient discharged without removing armband and transfered to another healthcare acute, sub acute , or extended care facility. Informed of privacy risks if armband lost or stolen.

## 2020-04-28 NOTE — PROGRESS NOTES
Problem: Self Care Deficits Care Plan (Adult)  Goal: *Acute Goals and Plan of Care (Insert Text)  Description: Occupational Therapy Goals  Initiated 4/27/2020 within 7 day(s). 1.  Patient will perform grooming with modified independence. 2.  Patient will perform bathing with modified independence. 3.  Patient will perform upper body dressing and lower body dressing with modified independence using adaptive equipment as needed. 4.  Patient will perform bedside commode transfers with modified independence using RW. 5.  Patient will perform all aspects of toileting with modified independence. 6.  Patient will participate in upper extremity therapeutic exercise/activities with modified independence for 10 minutes. 7.  Patient will utilize energy conservation techniques during functional activities with min verbal cues. Prior Level of Function: (I) w/ ADLs and Mod I functional mobility using SPC   Outcome: Progressing Towards Goal   OCCUPATIONAL THERAPY TREATMENT    Patient: Cherylene Lorenzo (83 y.o. female)  Date: 4/28/2020  Diagnosis: Hip fracture ([facility] [address] [S72.009A]   Hip fracture ([facility] [address]  Procedure(s) (LRB):  OPERATIVE REPAIR OF LEFT HIP FRACTURE; CLOSED TREATMENT OF LEFT ANKLE FRACTURE (Left) 3 Days Post-Op  Precautions: Fall, WBAT(LLE)  PLOF: see above    Chart, occupational therapy assessment, plan of care, and goals were reviewed. ASSESSMENT:  Nursing/Miranda cleared for pt to participate in OT tx session. Patient presents sitting in recliner, agreeable to adl routine. UB bathe: SBA seated, UB dress: SBA doff and don hospital gown. LB bathe: SBA seated anterior periarea, Dep in stance w/ RW posterior periarea.  Functional mobility: vc's for pacing, purselip breathing d/t pt appearing anxious e.g. BUE tremors noted, vc's for correct hand placement for pushing up from recliner arm rests, sit to stand w/ Min A and stand step using RW and vc's for correct feet placement, pt demonstrated good return demonstration. LB dressing seated edge of bed w/ education for AE issued: sock aide and reacher to doff socks and don socks, requiring Min A, pt reports difficulty lifting LLE and requires additional assist. Sit to supine: Max A. call mauro within reach & pt verbalized understanding to utilize for assist e.g. functional transfers in order to prevent falls. Progression toward goals:  []          Improving appropriately and progressing toward goals  [x]          Improving slowly and progressing toward goals  []          Not making progress toward goals and plan of care will be adjusted     PLAN:  Patient continues to benefit from skilled intervention to address the above impairments. Continue treatment per established plan of care. Discharge Recommendations:  Joselo Mejia  Further Equipment Recommendations for Discharge:  bedside commode, rolling walker, and wheelchair      SUBJECTIVE:   Patient stated I'm going to [facility]. today.     OBJECTIVE DATA SUMMARY:   Cognitive/Behavioral Status:  Neurologic State: Alert  Orientation Level: Oriented X4  Cognition: Appropriate safety awareness, Appropriate decision making  Safety/Judgement: Fall prevention    Functional Mobility and Transfers for ADLs:   Bed Mobility:    Sit to Supine: Maximum assistance;Assist x1      Transfers:  Sit to Stand: Minimum assistance  Stand to Sit: Minimum assistance     Bed to Chair: Minimum assistance;Assist x1(recliner->bed)    Balance:  Sitting: Impaired  Sitting - Static: Good (unsupported)  Sitting - Dynamic: Fair (occasional)  Standing: Impaired  Standing - Static: Fair(-)  Standing - Dynamic : Fair(-)    Pain:  Pain level pre-treatment: 7/10   Pain level post-treatment: 8/10  Pain Intervention(s): Medication (see MAR); Rest, Ice, Repositioning   Response to intervention: Nurse notified, See doc flow    Activity Tolerance:    fair  Please refer to the flowsheet for vital signs taken during this treatment.   After treatment:   []  Patient left in no apparent distress sitting up in chair  [x]  Patient left in no apparent distress in bed  [x]  Call bell left within reach  [x]  Nursing notified  []  Caregiver present  []  Bed alarm activated    COMMUNICATION/EDUCATION:   [x] Role of Occupational Therapy in the acute care setting  [x] Home safety education was provided and the patient/caregiver indicated understanding. [x] Patient/family have participated as able in working towards goals and plan of care. [x] Patient/family agree to work toward stated goals and plan of care. [] Patient understands intent and goals of therapy, but is neutral about his/her participation. [] Patient is unable to participate in goal setting and plan of care.       Thank you for this referral.  Mariela Pierce  Time Calculation: 24 mins

## 2020-04-28 NOTE — DISCHARGE SUMMARY
Discharge Summary    Patient: Aquiles Bowden               Sex: female          DOA: 4/24/2020         YOB: 1945      Age:  69 y.o.        LOS:  LOS: 4 days                Admit Date: 4/24/2020    Discharge Date: 4/28/2020    Admission Diagnoses: Hip fracture ([facility]    Discharge Diagnoses:    Problem List as of 4/28/2020 Date Reviewed: 4/25/2020          Codes Class Noted - Resolved    * (Principal) Hip fracture ([facility] ICD-10-CM: S72.009A  ICD-9-CM: 820.8  4/24/2020 - Present        COPD (chronic obstructive pulmonary disease) ([facility] ICD-10-CM: J44.9  ICD-9-CM: 474  4/24/2020 - Present        Parotid mass (Chronic) ICD-10-CM: K11.8  ICD-9-CM: 527.8  10/3/2014 - Present        RESOLVED: Encounter for colonoscopy due to history of adenomatous colonic polyps ICD-10-CM: Z12.11, Z86.010  ICD-9-CM: V76.51, V12.72  1/15/2015 - 9/26/2019        RESOLVED: Colon cancer screening ICD-10-CM: Z12.11  ICD-9-CM: V76.51  4/17/2014 - 9/30/2019                Discharge Condition:  Improved    Hospital Course:  Ms Billy Diana is a 69year old female who presented to the ER after falling and sustaining a left hip fracture. She underwent left cephalomedullary nail procedure which she has tolerated well. She also injured her left ankle which amounts to a sprain equivalent. Ms Billy Daina has progressed post-operatively and she has improved. She is appropriate for transfer to SNF later today to continue her recovery. Ms Billy Diana has known COPD, but did not have exacerbation during hospitalization. She is breathing comfortably at discharge. Consults: Orthopedics   Consulting Provider:  Teddie Siemens, MD        Discharge Medications:     Current Discharge Medication List      START taking these medications    Details   enoxaparin (LOVENOX) 40 mg/0.4 mL 0.4 mL by SubCUTAneous route daily for 30 days.  Indications: deep vein thrombosis prevention  Qty: 12 mL, Refills: 0      gabapentin (NEURONTIN) 300 mg capsule Take 1 Cap by mouth two (2) times a day. Max Daily Amount: 600 mg. Qty: 60 Cap, Refills: 0    Associated Diagnoses: Closed nondisplaced intertrochanteric fracture of left femur, initial encounter ([facility])      oxyCODONE-acetaminophen (Percocet) 5-325 mg per tablet Take 1 Tab by mouth every six (6) hours as needed for Pain for up to 3 days. Max Daily Amount: 4 Tabs. Qty: 12 Tab, Refills: 0    Associated Diagnoses: Closed nondisplaced intertrochanteric fracture of left femur, initial encounter ([facility]         CONTINUE these medications which have NOT CHANGED    Details   cholecalciferol (VITAMIN D3) 1,000 unit cap Take  by mouth daily. calcium carbonate (CALTREX) 600 mg (1,500 mg) tablet Take 600 mg by mouth daily. albuterol (PROVENTIL HFA, VENTOLIN HFA, PROAIR HFA) 90 mcg/actuation inhaler Take  by inhalation every four (4) hours as needed for Wheezing. budesonide-formoterol (SYMBICORT) 160-4.5 mcg/actuation HFA inhaler Take 2 Puffs by inhalation two (2) times a day. diphenhydrAMINE (BENADRYL) 25 mg capsule Take 25 mg by mouth nightly as needed for Sleep. FLUoxetine (PROZAC) 10 mg capsule Take 10 mg by mouth daily. allopurinol (ZYLOPRIM) 300 mg tablet Take  by mouth daily. multivitamins-minerals-lutein (CENTRUM SILVER) tab tablet Take 1 Tab by mouth daily. Activity: PT/OT Eval and Treat    Diet: Regular Diet    Wound Care: Post op wound care    Follow-up: Follow up with staff MD on arrival.    Follow up with Dr. Mariluz Emery in 1 month. Call [phone] for appointment.  Staples out 5-5-20

## 2020-05-07 ENCOUNTER — PATIENT OUTREACH (OUTPATIENT)
Dept: CASE MANAGEMENT | Age: 75
End: 2020-05-07

## 2020-05-07 NOTE — PROGRESS NOTES
Community Care Team Documentation for Patient in [facility]     Patient discharged from [facility] [address] [facility] [address] [facility], on 4/28/20. Hospital Discharge diagnosis:       Hip Fracture   COPD      SNF Attending Provider:      Anticipated discharge date from SNF:  TBD    PCP : Bia Fuchs MD    [facility] Team rounds completed, updates provided by facility. Brief Summary of Care:    Patient receiving PT/OT. WBAT with Left ankle brace. Recommending another 1-2 weeks of therapy at this time. Dispo:  Patient was living alone PTA in 2 story home. Bedroom and Bath on 1st floor. Sister lives across the street. Goal is for pt to return home. Will use [facility]. RRAT:  Low Risk            4       Total Score        4 Charlson Comorbidity Score (Age + Comorbid Conditions)        Criteria that do not apply:    Has Seen PCP in Last 6 Months (Yes=3, No=0)    . Living with Significant Other. Assisted Living. LTAC. SNF. or   Rehab    Patient Length of Stay (>5 days = 3)    IP Visits Last 12 Months (1-3=4, 4=9, >4=11)    Pt.  Coverage (Medicare=5 , Medicaid, or Self-Pay=4)        Active Ambulatory Problems     Diagnosis Date Noted    Parotid mass 10/03/2014    Hip fracture ([address] 04/24/2020    COPD (chronic obstructive pulmonary disease) ([address] 04/24/2020     Resolved Ambulatory Problems     Diagnosis Date Noted    Colon cancer screening 04/17/2014    Encounter for colonoscopy due to history of adenomatous colonic polyps 01/15/2015     Past Medical History:   Diagnosis Date    Anxiety     Arthritis     Cancer ([address] 1979    Chronic bronchitis ([address]     Chronic obstructive pulmonary disease (HCC)     Depression     GERD (gastroesophageal reflux disease)     Gout     Hyperlipemia     Menopause     Vitamin D deficiency

## 2020-10-09 ENCOUNTER — HOSPITAL ENCOUNTER (OUTPATIENT)
Dept: MAMMOGRAPHY | Age: 75
Discharge: HOME | End: 2020-10-09
Attending: FAMILY MEDICINE
Payer: MEDICARE

## 2020-10-09 ENCOUNTER — HOSPITAL ENCOUNTER (OUTPATIENT)
Dept: GENERAL RADIOLOGY | Age: 75
Discharge: HOME | End: 2020-10-09
Attending: FAMILY MEDICINE
Payer: MEDICARE

## 2020-10-09 DIAGNOSIS — E55.9 AVITAMINOSIS D: ICD-10-CM

## 2020-10-09 DIAGNOSIS — M81.0 SENILE OSTEOPOROSIS: ICD-10-CM

## 2020-10-09 DIAGNOSIS — Z12.31 VISIT FOR SCREENING MAMMOGRAM: ICD-10-CM

## 2020-10-09 PROCEDURE — 77080 DXA BONE DENSITY AXIAL: CPT

## 2020-10-09 PROCEDURE — 77063 BREAST TOMOSYNTHESIS BI: CPT

## 2021-09-22 ENCOUNTER — TRANSCRIBE ORDER (OUTPATIENT)
Dept: SCHEDULING | Age: 76
End: 2021-09-22

## 2021-09-22 DIAGNOSIS — M81.0 AGE-RELATED OSTEOPOROSIS WITHOUT CURRENT PATHOLOGICAL FRACTURE: Primary | ICD-10-CM

## 2021-09-23 ENCOUNTER — TRANSCRIBE ORDER (OUTPATIENT)
Dept: SCHEDULING | Age: 76
End: 2021-09-23

## 2021-09-23 DIAGNOSIS — Z12.31 SCREENING MAMMOGRAM FOR HIGH-RISK PATIENT: Primary | ICD-10-CM

## 2021-10-20 ENCOUNTER — HOSPITAL ENCOUNTER (OUTPATIENT)
Dept: BONE DENSITY | Age: 76
Discharge: HOME | End: 2021-10-20
Attending: FAMILY MEDICINE
Payer: MEDICARE

## 2021-10-20 ENCOUNTER — HOSPITAL ENCOUNTER (OUTPATIENT)
Dept: WOMENS IMAGING | Age: 76
Discharge: HOME | End: 2021-10-20
Attending: FAMILY MEDICINE
Payer: MEDICARE

## 2021-10-20 DIAGNOSIS — M81.0 AGE-RELATED OSTEOPOROSIS WITHOUT CURRENT PATHOLOGICAL FRACTURE: ICD-10-CM

## 2021-10-20 DIAGNOSIS — Z12.31 SCREENING MAMMOGRAM FOR HIGH-RISK PATIENT: ICD-10-CM

## 2021-10-20 PROCEDURE — 77080 DXA BONE DENSITY AXIAL: CPT

## 2021-10-20 PROCEDURE — 77063 BREAST TOMOSYNTHESIS BI: CPT

## 2022-03-18 PROBLEM — S72.009A HIP FRACTURE (HCC): Status: ACTIVE | Noted: 2020-04-24

## 2022-03-19 PROBLEM — J44.9 COPD (CHRONIC OBSTRUCTIVE PULMONARY DISEASE) (HCC): Status: ACTIVE | Noted: 2020-04-24

## 2022-10-24 ENCOUNTER — HOSPITAL ENCOUNTER (OUTPATIENT)
Dept: WOMENS IMAGING | Age: 77
Discharge: HOME | End: 2022-10-24
Attending: FAMILY MEDICINE
Payer: MEDICARE

## 2022-10-24 DIAGNOSIS — Z12.31 ENCOUNTER FOR SCREENING MAMMOGRAM FOR BREAST CANCER: ICD-10-CM

## 2022-10-24 PROCEDURE — 77063 BREAST TOMOSYNTHESIS BI: CPT

## 2023-07-19 NOTE — PROGRESS NOTES
completed a follow up visit with and Spiritual assessment of patient in room 3019 and offered Pastoral care support once again. Patient shared that she will be moved to [facility] this afternoon  for a period of time.  Chaplains will continue to follow and will provide pastoral care on an as needed/requested basis    Chaplain Gama Gonzalez   Board Certified [address] [place] [address]   [phone] DISCHARGE INSTRUCTIONS    During office hours (8:00 a.m.- 4:00 p.m.) questions or concerns may be answered  by calling [facility] Navigation Nurses at  [phone].  Messages received after hours will be returned the following business day.      In the case of an emergency, please dial 911 or seek assistance at the nearest Emergency Room/Urgent Care facility.     All Patients:  You may experience an increase in your symptoms for the first 2 days, once the numbing medication wears off.    You may resume your regular medication, no pain medication until you have completed your diary    You may shower. No swimming, tub bath or hot tub for 24 hours; remove bandage after 4 hours    Continue your activities that can cause you pain to test the blocks.                         You should not drive for the next 3 to 5 hours (have someone drive you)           POSSIBLE PROCEDURE SIDE EFFECTS  -Call [facility] if concerned-    Increased Pain  Increased numbness/tingling     Nausea/Vomiting  Bruising/bleeding at site (hematoma)             Swelling at site (edema) Headache  Difficulty walking  Infection        Fever greater than 100.5

## 2025-05-09 NOTE — PROGRESS NOTES
1945--Bedside and Verbal shift change report given to [employer] RN (oncoming nurse) by Dixie Bledsoe (offgoing nurse). Report included the following information SBAR, Kardex, Intake/Output and MAR. Coby PIRES( Preceptor) will be assisting in the care of this Pt.   2000-- shift assessment performed. Pt in bed, resting quietly, no distress noted. Frequently used items within reach. 2118--due meds administered. Pt tolerated well. 0004--vitals taken, pt complained of no pain, no distress noted, call light, bedpan within reach. 0413--vitals done, pain med administered(MAMAKSIM). Pt in bed, resting quietly, frequently used items within reach. 0712--Bedside and Verbal shift change report given to Aiyana Rizvi (oncoming nurse) by [employer] (offgoing nurse). Report included the following information SBAR, Kardex, Intake/Output and MAR. Staff:  [facility] Colon Screening Orders    Please schedule: Colonoscopy [id number]    Diagnosis: Colon Screening Z12.11      Please send Trilyte bowel prep         Medication adjustments:  Day before procedure, hold:  Day of procedure, hold:     >>>Please inform patient if new medications are started after scheduling procedure they need to call clinic to notify us.      Called patient for a scheduled telephone colon screening/FIT positive result.     Medications, pharmacy, and allergies verified with patient over the phone.     Please advise on colonoscopy orders and appropriate bowel prep.      Age 45-76 y/o:    MD preference: Rangel  Last CBC drawn: CBC:  Lab Results   Component Value Date    WBC 5.1 03/11/2025    WBC 8.0 12/11/2023    WBC 5.3 10/09/2015    HEMOGLOBIN 13.8 10/09/2015    HEMOGLOBIN 14.1 02/18/2014    .0 03/11/2025    .0 12/11/2023     10/09/2015     Date of positive FIT test: N/A  H/W/BMI: BMI 24.79     Special comments/notes:  Recall details:      Last Procedure, Date, MD:    5 years ago    Last diagnosis:     Recalled for (mth/yrs):     Sedation used previously:     Last Prep Used:     Quality of Prep:        Telephone colon screening Questionnaires:  Yes No   Are you currently experiencing any new GI symptoms? []  [x]    If yes, symptom details:       Rectal Bleeding with or without bowel movements: []  [x]    Black stool: []  [x]    Dysphagia &Food feeling/getting stuck: []  [x]    Intractable Vomiting: []  [x]    Unexplained weight loss: []  [x]    First colonoscopy? []  [x]    Family history of colon cancer? []  [x]    Any issues with anesthesia? [x]  []    If yes, explain details: urine retention       Personal history of Resp. Issues/Oxygen Use/VENKATESH/COPD? []  [x]    If yes, with CPAP/BiPAP:  []  []    History of devices Pacemaker/Defibrillator/Stents? []  [x]    History of Cardiac/CVA issues/(MI/Stroke):  []  [x]       Medication usage:  Yes  No   Anticoagulants:  Anticoagulant  (Except Aspirin) ? Route to RN staff to obtain ordering provider orders []  [x]    Diabetic Meds:   PO DM Meds ? Hold day prior and day of procedure  Insulin ? Route to RN staff to obtain ordering provider orders  []  [x]    Weight loss meds (phentermine/Vyvanse/Saxsenda): []  [x]    Iron/Herbal/Multivitamin Supplement (RX/OTC): [x]  []    Usage of marijuana, CBD &/or vape products: []  [x]          Allergies[1]    Medications - Current[2]    Past Surgical History[3]        [1]   Allergies  Allergen Reactions    Codeine    [2] No current outpatient medications on file.  [3]   Past Surgical History:  Procedure Laterality Date    Appendectomy      Appendectomy      Back surgery      Cholecystectomy      Colonoscopy      Hernia surgery      Laparoscopic cholecystectomy      Other surgical history  2020    hernia surgery    Other surgical history  2005    knee surgery r    Removal gallbladder      Vasectomy

## (undated) DEVICE — DRESSING,GAUZE,XEROFORM,CURAD,1"X8",ST: Brand: CURAD

## (undated) DEVICE — NEEDLE HYPO 25GA L1.5IN BVL ORIENTED ECLIPSE

## (undated) DEVICE — PREP SKN CHLRAPRP 26ML TNT -- CONVERT TO ITEM 373320

## (undated) DEVICE — SUTURE ABSORBABLE BRAIDED 2-0 CT-1 27 IN UD VICRYL J259H

## (undated) DEVICE — SOLUTION IV STRL H2O 500 ML AQUALITE POUR BTL

## (undated) DEVICE — TOWEL SURG W16XL26IN BLU NONFENESTRATED DLX ST 2 PER PK

## (undated) DEVICE — INTENDED FOR TISSUE SEPARATION, AND OTHER PROCEDURES THAT REQUIRE A SHARP SURGICAL BLADE TO PUNCTURE OR CUT.: Brand: BARD-PARKER SAFETY BLADES SIZE 10, STERILE

## (undated) DEVICE — U-DRAPE: Brand: CONVERTORS

## (undated) DEVICE — SHEET,DRAPE,70X100,STERILE: Brand: MEDLINE

## (undated) DEVICE — REM POLYHESIVE ADULT PATIENT RETURN ELECTRODE: Brand: VALLEYLAB

## (undated) DEVICE — KENDALL SCD EXPRESS SLEEVES, KNEE LENGTH, MEDIUM: Brand: KENDALL SCD

## (undated) DEVICE — STERILE POLYISOPRENE POWDER-FREE SURGICAL GLOVES: Brand: PROTEXIS

## (undated) DEVICE — Device

## (undated) DEVICE — NEEDLE HYPO 25GA L1.5IN BLU POLYPR HUB S STL REG BVL STR

## (undated) DEVICE — SOL IRRIGATION INJ NACL 0.9% 500ML BTL

## (undated) DEVICE — 3M™ IOBAN™ 2 ANTIMICROBIAL INCISE DRAPE 6650EZ: Brand: IOBAN™ 2

## (undated) DEVICE — KIT PROC HIP PINNING CUST LF --

## (undated) DEVICE — SUTURE VCRL SZ 0 L36IN ABSRB UD L36MM CT-1 1/2 CIR J946H

## (undated) DEVICE — SYR 10ML CTRL LR LCK NSAF LF --

## (undated) DEVICE — DRESSING TRNSPAR 5IN LEN 4IN W FLM ST BIOCL

## (undated) DEVICE — PIN THRD GUIDE STE 3.2X444 --